# Patient Record
Sex: FEMALE | Race: WHITE | URBAN - METROPOLITAN AREA
[De-identification: names, ages, dates, MRNs, and addresses within clinical notes are randomized per-mention and may not be internally consistent; named-entity substitution may affect disease eponyms.]

---
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
## 2019-04-26 LAB
ALBUMIN SERPL-MCNC: 4.8 G/DL
ALP BLD-CCNC: 39 U/L
ALT SERPL-CCNC: 14 U/L
ANION GAP SERPL CALCULATED.3IONS-SCNC: NORMAL MMOL/L
AST SERPL-CCNC: 16 U/L
BASOPHILS ABSOLUTE: 47 /ΜL
BASOPHILS RELATIVE PERCENT: 0.5 %
BILIRUB SERPL-MCNC: 0.9 MG/DL (ref 0.1–1.4)
BUN BLDV-MCNC: 15 MG/DL
CALCIUM SERPL-MCNC: 9.6 MG/DL
CHLORIDE BLD-SCNC: 104 MMOL/L
CO2: 27 MMOL/L
CREAT SERPL-MCNC: 0.75 MG/DL
EOSINOPHILS ABSOLUTE: 75 /ΜL
EOSINOPHILS RELATIVE PERCENT: 0.8 %
FERRITIN: 69 NG/ML (ref 9–150)
GFR CALCULATED: 116
GLUCOSE BLD-MCNC: 93 MG/DL
HCT VFR BLD CALC: 37.4 % (ref 36–46)
HEMOGLOBIN: 13.1 G/DL (ref 12–16)
LYMPHOCYTES ABSOLUTE: 2143 /ΜL
LYMPHOCYTES RELATIVE PERCENT: 22.8 %
MCH RBC QN AUTO: 32.1 PG
MCHC RBC AUTO-ENTMCNC: 35 G/DL
MCV RBC AUTO: 91.7 FL
MONOCYTES ABSOLUTE: 489 /ΜL
MONOCYTES RELATIVE PERCENT: 5.2 %
NEUTROPHILS ABSOLUTE: 6646 /ΜL
NEUTROPHILS RELATIVE PERCENT: 70.7 %
PDW BLD-RTO: 11.9 %
PLATELET # BLD: 294 K/ΜL
PMV BLD AUTO: 10.8 FL
POTASSIUM SERPL-SCNC: 4.1 MMOL/L
RBC # BLD: 4.08 10^6/ΜL
SODIUM BLD-SCNC: 139 MMOL/L
TOTAL PROTEIN: 6.7
VITAMIN B-12: 583
VITAMIN D 25-HYDROXY: 29
VITAMIN D2, 25 HYDROXY: NORMAL
VITAMIN D3,25 HYDROXY: NORMAL
WBC # BLD: 9.4 10^3/ML

## 2021-01-29 ENCOUNTER — OFFICE VISIT (OUTPATIENT)
Dept: FAMILY MEDICINE CLINIC | Age: 21
End: 2021-01-29
Payer: COMMERCIAL

## 2021-01-29 VITALS
HEART RATE: 102 BPM | TEMPERATURE: 98.2 F | SYSTOLIC BLOOD PRESSURE: 98 MMHG | BODY MASS INDEX: 20.73 KG/M2 | OXYGEN SATURATION: 98 % | RESPIRATION RATE: 18 BRPM | DIASTOLIC BLOOD PRESSURE: 60 MMHG | HEIGHT: 66 IN | WEIGHT: 129 LBS

## 2021-01-29 DIAGNOSIS — F84.0 AUTISM SPECTRUM DISORDER: ICD-10-CM

## 2021-01-29 DIAGNOSIS — J45.20 MILD INTERMITTENT ASTHMA WITHOUT COMPLICATION: ICD-10-CM

## 2021-01-29 DIAGNOSIS — F41.9 ANXIETY: Primary | ICD-10-CM

## 2021-01-29 PROCEDURE — 99204 OFFICE O/P NEW MOD 45 MIN: CPT | Performed by: INTERNAL MEDICINE

## 2021-01-29 RX ORDER — NORETHINDRONE ACETATE AND ETHINYL ESTRADIOL 1MG-20(24)
KIT ORAL
COMMUNITY
Start: 2020-11-15

## 2021-01-29 RX ORDER — SERTRALINE HYDROCHLORIDE 100 MG/1
100 TABLET, FILM COATED ORAL DAILY
COMMUNITY
End: 2021-03-18 | Stop reason: SDUPTHER

## 2021-01-29 RX ORDER — LEVALBUTEROL TARTRATE 45 UG/1
AEROSOL, METERED ORAL
COMMUNITY
End: 2021-10-29 | Stop reason: SDUPTHER

## 2021-01-29 RX ORDER — CLONAZEPAM 0.5 MG/1
0.25 TABLET ORAL NIGHTLY PRN
Qty: 30 TABLET | Refills: 2 | Status: SHIPPED | OUTPATIENT
Start: 2021-01-29 | End: 2021-04-30 | Stop reason: SDUPTHER

## 2021-01-29 ASSESSMENT — ANXIETY QUESTIONNAIRES
1. FEELING NERVOUS, ANXIOUS, OR ON EDGE: 1-SEVERAL DAYS
2. NOT BEING ABLE TO STOP OR CONTROL WORRYING: 3-NEARLY EVERY DAY
5. BEING SO RESTLESS THAT IT IS HARD TO SIT STILL: 3-NEARLY EVERY DAY
6. BECOMING EASILY ANNOYED OR IRRITABLE: 3-NEARLY EVERY DAY
GAD7 TOTAL SCORE: 15
7. FEELING AFRAID AS IF SOMETHING AWFUL MIGHT HAPPEN: 1-SEVERAL DAYS

## 2021-01-29 ASSESSMENT — PATIENT HEALTH QUESTIONNAIRE - PHQ9
1. LITTLE INTEREST OR PLEASURE IN DOING THINGS: 0
SUM OF ALL RESPONSES TO PHQ9 QUESTIONS 1 & 2: 0
SUM OF ALL RESPONSES TO PHQ QUESTIONS 1-9: 0
2. FEELING DOWN, DEPRESSED OR HOPELESS: 0
SUM OF ALL RESPONSES TO PHQ QUESTIONS 1-9: 0

## 2021-01-29 NOTE — PROGRESS NOTES
[facility]  [address] [place] [address]  [place] [address]  Dept: [phone]  Dept Fax: [phone]     NAME: Florencio Castano        :  2000        MRN:  <[id number]>    Chief Complaint   Patient presents with    New Patient     est pcp    Anxiety     Zoloft is not enough / anxiety has increased lately    Allergies     allergies to bananas / blood test requested to see what she is allergic to       History of Present Illness  Florencio Castano is a 23 y.o. female who presents today to [facility]. Patient was previously following with Dr. Van Gaming located in [place]. Prior records will be obtained for review. Patient reports that in the summer 2019 she was evaluated at the [facility] for autism and diagnosed with autism level 1 in addition to social and general anxiety disorders. She was started on Zoloft at that time and over the last couple years her dose has slowly been titrated up to manage her anxiety symptoms. She reports that recently her obsessive behaviors have worsened. Patient is currently attending [employer] remotely [employer]. She does well in school and states that recently the dose of behaviors consist of the need to write down all of her assignments on a [employer] calendar which she usually does however now she has the need to check each assignment every single day against her assignment list as well as a course syllabus. When she goes to sleep at night she notes racing thoughts and anxiety about the day and her upcoming day. Reports waking up multiple times per night and having difficulty sleeping due to racing thoughts and obsessive behaviors. She has never been on any other therapies other than Zoloft. Otherwise patient takes daily birth control pills to manage mood swings associated with hormonal shifts.   She has tried to go off of the birth control and mood swings return almost immediately so she is very compliant with these. She also uses a Xopenex inhaler as needed for asthma symptoms. Review of Systems  Please see HPI above. All bolded are positive. Gen: fever, chills, fatigue, weakness, diaphoresis, or unintentional weight change  Head: headache, vision change, hearing loss  Chest: chest pain/heaviness, palpitations  Lungs: shortness of breath, wheezing, coughing, hemoptysis  Abdomen: abdominal pain, nausea, vomiting, diarrhea, constipation, melena, hematochezia, hematemesis, or loss of appetite  Extremities: lower extremity edema, myalgias, arthralgias  Urinary: dysuria, hematuria, weak flow, or increase in frequency  Neurologic: lightheadedness, dizziness, confusion, syncope  Endocrine: polydipsia, polyuria, heat or cold intolerance  Psychiatric: depression, suicidal ideation, anxiety  Derm: Rashes, ulcers, burns    Medical History   No past medical history on file. Surgical History   No past surgical history on file. Family History  No family history on file. Social History  Social History     Tobacco Use    Smoking status: Never Smoker    Smokeless tobacco: Never Used   Substance Use Topics    Alcohol use: Yes     Comment: wine       Home Medications  Current Outpatient Medications   Medication Sig Dispense Refill    sertraline (ZOLOFT) 100 MG tablet Take 100 mg by mouth daily      BLISOVI 24 FE 1-20 MG-MCG(24) TABS TAKE 1 TABLET BY MOUTH EVERY DAY FOR CONTINUOUS CYCLE SKIPPING INACTIVE PILLS.  levalbuterol (XOPENEX HFA) 45 MCG/ACT inhaler       clonazePAM (KLONOPIN) 0.5 MG tablet Take 0.5 tablets by mouth nightly as needed for Anxiety for up to 30 days. 30 tablet 2     No current facility-administered medications for this visit.          Allergies  Allergies   Allergen Reactions    Penicillins     Sulfa Antibiotics      Drops in eye, pt was young       Objective  Vitals:    01/29/21 1101   BP: 98/60   Pulse: 102   Resp: 18   Temp: 98.2 °F (36.8 °C)   SpO2: 98% Physical Exam:  General: Awake, alert, and oriented to person, place, time, and purpose, appears stated age and cooperative, No acute distress. Patient well engage throughout her visit today. Head: Normocephalic, atraumatic  Eyes: conjunctivae/corneas clear. PERRL, EOM's intact. Mouth: Mucous membranes moist with no pharyngeal exudate or erythema  Neck: no JVD, no adenopathy, no carotid bruit and supple, symmetrical, trachea midline  Back: symmetric, ROM normal, No CVA tenderness. Lungs: clear to auscultation bilaterally without wheezes, rales, or rhonchi  Heart: regular rate and rhythm, S1, S2 normal, no murmur, click, rub or gallop  Abdomen: soft, non-tender; bowel sounds normal; no masses,  no organomegaly  Extremities:atraumatic, no cyanosis, no edema  Pulses: 2+ pedal pulses palpated  Skin: color, texture, turgor within normal limits. No rashes or lesions or normal  Neurologic:speech appropriate, moves all 4 extremities, normal muscle strength and tone, CN 2-12 grossly intact    Labs  No results found for: WBC, HGB, HCT, PLT, NA, K, CL, CREATININE, BUN, CO2, GLUCOSE, ALT, AST, INR  No results found for: TSH  No results found for: TRIG  No results found for: HDL  No results found for: LDLCALC  No results found for: LABA1C  No results found for: INR, PROTIME   *All recent labs were reviewed. Please see electronic chart for a more comprehensive set of labs    Radiology  No results found. Health Maintenance Due   Topic Date Due    Hepatitis C screen  2000    HPV vaccine (1 - 2-dose series) 12/07/2010    HIV screen  12/07/2014    Chlamydia screen  12/07/2015    DTaP/Tdap/Td vaccine (1 - Tdap) 12/07/2018    Cervical cancer screen  12/07/2020         Assessment and Plan  [facility] was seen today for new patient, anxiety and allergies. Diagnoses and all orders for this visit:    Anxiety  -     clonazePAM (KLONOPIN) 0.5 MG tablet;  Take 0.5 tablets by mouth nightly as needed for Anxiety for up to 30 days.  -     External Referral To Counseling Services    Autism spectrum disorder    Mild intermittent asthma without complication    Patient was given a list of local counselors to review. Instructed to start the Klonopin at 0.25 mg nightly by breaking the pills in half. She was instructed to titrate up to the 0.5 mg dose if she is not receiving any relief from 0.25 mg dose. If she has no relief with a 0.5 mg dose she is to call the office for further instructions. Educational materials and/or home exercises printed for patient's review and were included in patient instructions on his/her After Visit Summary and given to patient at the end of visit. Counseled regarding above diagnosis, including possible risks and complications,  especially if left uncontrolled. Counseled regarding the possible side effects, risks, benefits and alternatives to treatment; patient and/or guardian verbalizes understanding, agrees,feels comfortable with and wishes to proceed with above treatment plan. Advised patient to call Nura Horta new medication issues, and read all Rx info from pharmacy to assure aware of all possible risks and side effects of medication before taking. Reviewed age and gender appropriate health screening exams and vaccinations. Advised patient regarding importance of keeping up with recommended health maintenance and to schedule as soon as possible if overdue, as this is important in assessing for undiagnosed pathology, especially cancer, as well as protecting against potentially harmful/life threatening disease. Patient verbalizes understanding and agrees with above counseling, assessment and plan. All questions answered.     Carlyle Pimentel DO   1/29/2021  12:03 PM

## 2021-02-24 DIAGNOSIS — Z83.79 FAMILY HISTORY OF CELIAC DISEASE: Primary | ICD-10-CM

## 2021-02-26 DIAGNOSIS — Z83.79 FAMILY HISTORY OF CELIAC DISEASE: ICD-10-CM

## 2021-02-27 LAB — IGA: 147 MG/DL (ref 70–400)

## 2021-03-01 LAB — TISSUE TRANSGLUTAMINASE IGA: 6 U/ML (ref 0–3)

## 2021-03-02 DIAGNOSIS — K90.9 INTESTINAL MALABSORPTION, UNSPECIFIED TYPE: Primary | ICD-10-CM

## 2021-03-18 DIAGNOSIS — F41.9 ANXIETY: Primary | ICD-10-CM

## 2021-03-18 RX ORDER — SERTRALINE HYDROCHLORIDE 100 MG/1
100 TABLET, FILM COATED ORAL DAILY
Qty: 30 TABLET | Refills: 5 | Status: SHIPPED
Start: 2021-03-18 | End: 2021-04-30 | Stop reason: SDUPTHER

## 2021-04-05 ENCOUNTER — IMMUNIZATION (OUTPATIENT)
Dept: PRIMARY CARE CLINIC | Age: 21
End: 2021-04-05
Payer: COMMERCIAL

## 2021-04-05 PROCEDURE — 91301 COVID-19, MODERNA VACCINE 100MCG/0.5ML DOSE: CPT | Performed by: NURSE PRACTITIONER

## 2021-04-05 PROCEDURE — 0011A COVID-19, MODERNA VACCINE 100MCG/0.5ML DOSE: CPT | Performed by: NURSE PRACTITIONER

## 2021-04-30 ENCOUNTER — OFFICE VISIT (OUTPATIENT)
Dept: FAMILY MEDICINE CLINIC | Age: 21
End: 2021-04-30
Payer: COMMERCIAL

## 2021-04-30 VITALS
WEIGHT: 131 LBS | OXYGEN SATURATION: 98 % | SYSTOLIC BLOOD PRESSURE: 92 MMHG | DIASTOLIC BLOOD PRESSURE: 60 MMHG | HEART RATE: 90 BPM | TEMPERATURE: 97 F | RESPIRATION RATE: 18 BRPM | BODY MASS INDEX: 21.14 KG/M2

## 2021-04-30 DIAGNOSIS — F41.9 ANXIETY: ICD-10-CM

## 2021-04-30 PROCEDURE — 99213 OFFICE O/P EST LOW 20 MIN: CPT | Performed by: INTERNAL MEDICINE

## 2021-04-30 RX ORDER — CLONAZEPAM 0.5 MG/1
0.25 TABLET ORAL NIGHTLY PRN
Qty: 45 TABLET | Refills: 1 | Status: SHIPPED
Start: 2021-04-30 | End: 2021-10-29 | Stop reason: SDUPTHER

## 2021-04-30 RX ORDER — SERTRALINE HYDROCHLORIDE 100 MG/1
100 TABLET, FILM COATED ORAL DAILY
Qty: 90 TABLET | Refills: 1 | Status: SHIPPED
Start: 2021-04-30 | End: 2021-10-19

## 2021-04-30 SDOH — ECONOMIC STABILITY: TRANSPORTATION INSECURITY
IN THE PAST 12 MONTHS, HAS LACK OF TRANSPORTATION KEPT YOU FROM MEETINGS, WORK, OR FROM GETTING THINGS NEEDED FOR DAILY LIVING?: NO

## 2021-04-30 SDOH — ECONOMIC STABILITY: TRANSPORTATION INSECURITY
IN THE PAST 12 MONTHS, HAS THE LACK OF TRANSPORTATION KEPT YOU FROM MEDICAL APPOINTMENTS OR FROM GETTING MEDICATIONS?: NO

## 2021-04-30 SDOH — ECONOMIC STABILITY: FOOD INSECURITY: WITHIN THE PAST 12 MONTHS, YOU WORRIED THAT YOUR FOOD WOULD RUN OUT BEFORE YOU GOT MONEY TO BUY MORE.: NEVER TRUE

## 2021-04-30 SDOH — ECONOMIC STABILITY: FOOD INSECURITY: WITHIN THE PAST 12 MONTHS, THE FOOD YOU BOUGHT JUST DIDN'T LAST AND YOU DIDN'T HAVE MONEY TO GET MORE.: NEVER TRUE

## 2021-04-30 NOTE — PROGRESS NOTES
[facility] PRIMARY [facility]  [address] [address]  Dept: [phone]  Dept Fax: [phone]     NAME: Carol Melvin        :  2000        MRN:  <[id number]>    Chief Complaint   Patient presents with    3 Month Follow-Up    Anxiety    Medication Refill     nees 90 day rx's or insurance will not cover per patient       Subjective     History of Present Illness  Carol Melvin is a 23 y.o. female who presents today for routine follow up and medication refill. She has been doing well on 0.25 mg Klonopin and 100 mg Zoloft both taken at night. She has started seeing Grace at [facility] and reports that she is liking her therapist and doing well there. She did follow up with GI and underwent EGD evaluation for possible celiacs disease. She was told that all biopsies were negative and she does not have celiacs, rather gastritis. She has been following a gluten free diet for the last several weeks and notes a drastic improvement in her symptoms. Review of Systems  Please see HPI above. All bolded are positive. Gen: fever, chills, fatigue, weakness, diaphoresis, unintentional weight change  Head: headache, vision change, hearing loss  Chest: chest pain/heaviness, palpitations  Lungs: shortness of breath, wheezing, coughing, hemoptysis  Abdomen: abdominal pain, nausea, vomiting, diarrhea, constipation, melena, hematochezia, hematemesis, loss of appetite  Extremities: lower extremity edema, myalgias, arthralgias  Urinary: dysuria, hematuria, weak flow, increase in frequency  Neurologic: lightheadedness, dizziness, confusion, syncope  Endocrine: polydipsia, polyuria, heat or cold intolerance  Psychiatric: depression, suicidal ideation, anxiety  Derm: Rashes, ulcers, burns    Past Medical Hx:  No past medical history on file. Past Surgical Hx:  No past surgical history on file. Family Hx:  No family history on file.     Social Hx:  Social History     Tobacco Use    Smoking status: Never Smoker    Smokeless tobacco: Never Used   Substance Use Topics    Alcohol use: Yes     Comment: wine       Home Medications:  Current Outpatient Medications   Medication Sig Dispense Refill    sertraline (ZOLOFT) 100 MG tablet Take 1 tablet by mouth daily 90 tablet 1    clonazePAM (KLONOPIN) 0.5 MG tablet Take 0.5 tablets by mouth nightly as needed for Anxiety for up to 180 days. 45 tablet 1    BLISOVI 24 FE 1-20 MG-MCG(24) TABS TAKE 1 TABLET BY MOUTH EVERY DAY FOR CONTINUOUS CYCLE SKIPPING INACTIVE PILLS.  levalbuterol (XOPENEX HFA) 45 MCG/ACT inhaler        No current facility-administered medications for this visit. Allergies: Allergies   Allergen Reactions    Amoxicillin      rash    Penicillins     Sulfa Antibiotics      Drops in eye, pt was young       Objective     Vitals:    04/30/21 0946   BP: 92/60   Pulse: 90   Resp: 18   Temp: 97 °F (36.1 °C)   TempSrc: Temporal   SpO2: 98%   Weight: 131 lb (59.4 kg)        Physical Exam  General: Awake, alert, and oriented to person, place, time, and purpose, appears stated age and cooperative, No acute distress  Head: Normocephalic, atraumatic  Eyes: conjunctivae/corneas clear, EOMI  Mouth: Mucous membranes moist with no pharyngeal exudate or erythema  Neck: no JVD, no adenopathy, no carotid bruit, supple, symmetrical, trachea midline  Back: symmetric, ROM normal, No CVA tenderness. Lungs: clear to auscultation bilaterally without wheezes, rales, or rhonchi  Heart: regular rate and rhythm, S1, S2 normal, no murmur, click, rub or gallop  Abdomen: soft, non-tender; bowel sounds normal; no masses,  no organomegaly  Extremities: atraumatic, no cyanosis, no edema, 2+ pulses palpated in all 4 extremities  Skin: color, texture, turgor within normal limits.  No rashes or lesions or normal  Neurologic:speech appropriate, moves all 4 extremities, normal muscle strength and tone, CN 2-12 grossly intact    Labs:  No results found for: WBC, HGB, HCT, PLT, NA, K, CL, CREATININE, BUN, CO2, GLUCOSE, ALT, AST, INR  No results found for: TSH  No results found for: TRIG  No results found for: HDL  No results found for: LDLCALC  No results found for: LABA1C  No results found for: INR, PROTIME   *All recent labs were reviewed. Please see electronic chart for a more comprehensive set of labs    Radiology:  No results found. Assessment and Plan     Patient is a 23 y.o. female who presented to the office for follow up. Full problem list is as follows: There is no problem list on file for this patient. Diamond Resendiz was seen today for 3 month follow-up, anxiety and medication refill. Diagnoses and all orders for this visit:    Anxiety  -     sertraline (ZOLOFT) 100 MG tablet; Take 1 tablet by mouth daily  -     clonazePAM (KLONOPIN) 0.5 MG tablet; Take 0.5 tablets by mouth nightly as needed for Anxiety for up to 180 days. Patient needs 90 day supplies per insurance. Educational materials and/or home exercises printed for patient's review and were included in patient instructions on his/her After Visit Summary and given to patient at the end of visit. Counseled regarding above diagnosis, including possible risks and complications, especially if left uncontrolled. Counseled regarding the possible side effects, risks, benefits and alternatives to treatment; patient and/or guardian verbalizes understanding, agrees, feels comfortable with and wishes to proceed with above treatment plan. Advised patient to call Jerson Rios new medication issues, and read all Rx info from pharmacy to assure aware of all possible risks and side effects of any medication before taking. Reviewed age and gender appropriate health screening exams and vaccinations.   Advised patient regarding importance of keeping up with recommended health maintenance and to schedule as soon as possible if overdue, as this is important in assessing for undiagnosed pathology, especially cancer, as well as protecting against potentially harmful/life threatening disease. Patient verbalizes understanding and agrees with above counseling, assessment and plan. All questions answered.     Riki Gayle DO   4/30/2021  10:14 AM

## 2021-05-04 ENCOUNTER — IMMUNIZATION (OUTPATIENT)
Dept: PRIMARY CARE CLINIC | Age: 21
End: 2021-05-04
Payer: COMMERCIAL

## 2021-05-04 PROCEDURE — 91301 COVID-19, MODERNA VACCINE 100MCG/0.5ML DOSE: CPT | Performed by: NURSE PRACTITIONER

## 2021-05-04 PROCEDURE — 0012A COVID-19, MODERNA VACCINE 100MCG/0.5ML DOSE: CPT | Performed by: NURSE PRACTITIONER

## 2021-10-18 DIAGNOSIS — F41.9 ANXIETY: ICD-10-CM

## 2021-10-19 RX ORDER — SERTRALINE HYDROCHLORIDE 100 MG/1
TABLET, FILM COATED ORAL
Qty: 90 TABLET | Refills: 1 | Status: SHIPPED
Start: 2021-10-19 | End: 2021-10-29 | Stop reason: SDUPTHER

## 2021-10-29 ENCOUNTER — OFFICE VISIT (OUTPATIENT)
Dept: FAMILY MEDICINE CLINIC | Age: 21
End: 2021-10-29
Payer: COMMERCIAL

## 2021-10-29 VITALS
HEART RATE: 102 BPM | HEIGHT: 66 IN | SYSTOLIC BLOOD PRESSURE: 98 MMHG | RESPIRATION RATE: 15 BRPM | OXYGEN SATURATION: 97 % | TEMPERATURE: 97.9 F | DIASTOLIC BLOOD PRESSURE: 66 MMHG | WEIGHT: 123.4 LBS | BODY MASS INDEX: 19.83 KG/M2

## 2021-10-29 DIAGNOSIS — J45.909 UNCOMPLICATED ASTHMA, UNSPECIFIED ASTHMA SEVERITY, UNSPECIFIED WHETHER PERSISTENT: ICD-10-CM

## 2021-10-29 DIAGNOSIS — Z23 FLU VACCINE NEED: ICD-10-CM

## 2021-10-29 DIAGNOSIS — L98.9 SKIN LESION: ICD-10-CM

## 2021-10-29 DIAGNOSIS — F41.9 ANXIETY: Primary | ICD-10-CM

## 2021-10-29 PROCEDURE — 90674 CCIIV4 VAC NO PRSV 0.5 ML IM: CPT | Performed by: INTERNAL MEDICINE

## 2021-10-29 PROCEDURE — 99213 OFFICE O/P EST LOW 20 MIN: CPT | Performed by: INTERNAL MEDICINE

## 2021-10-29 PROCEDURE — 90471 IMMUNIZATION ADMIN: CPT | Performed by: INTERNAL MEDICINE

## 2021-10-29 RX ORDER — LEVALBUTEROL TARTRATE 45 UG/1
2 AEROSOL, METERED ORAL EVERY 6 HOURS PRN
Qty: 1 EACH | Refills: 3 | Status: SHIPPED | OUTPATIENT
Start: 2021-10-29

## 2021-10-29 RX ORDER — CLONAZEPAM 0.5 MG/1
0.25 TABLET ORAL NIGHTLY PRN
Qty: 45 TABLET | Refills: 1 | Status: SHIPPED
Start: 2021-10-29 | End: 2022-05-04 | Stop reason: SDUPTHER

## 2021-10-29 RX ORDER — SERTRALINE HYDROCHLORIDE 100 MG/1
TABLET, FILM COATED ORAL
Qty: 90 TABLET | Refills: 1 | Status: SHIPPED
Start: 2021-10-29 | End: 2022-05-04 | Stop reason: SDUPTHER

## 2021-10-29 NOTE — PROGRESS NOTES
[facility]  [address] [place] [address]  [place] [address]  Dept: [phone]  Dept Fax: [phone]     NAME: Jasmyne Jean        :  2000        MRN:  <[id number]>    Chief Complaint   Patient presents with    Anxiety     doing ok    Flu Vaccine       Subjective     History of Present Illness  Jasmyne Jean is a 23 y.o. female who presents today for routine follow up and medication refill. Patient reports that she is doing well on her current medications. She continues to follow a gluten free diet and is doing well. Patient is wanting a dermatology recommendation as she has a wart on her foot and reports a couple other concerning lesions she would like looked at. Wart freezing was offered to her today and she declined. Review of Systems  Please see HPI above. All bolded are positive. Gen: fever, chills, fatigue, weakness, diaphoresis, unintentional weight change  Head: headache, vision change, hearing loss  Chest: chest pain/heaviness, palpitations  Lungs: shortness of breath, wheezing, coughing, hemoptysis  Abdomen: abdominal pain, nausea, vomiting, diarrhea, constipation, melena, hematochezia, hematemesis, loss of appetite  Extremities: lower extremity edema, myalgias, arthralgias  Urinary: dysuria, hematuria, weak flow, increase in frequency  Neurologic: lightheadedness, dizziness, confusion, syncope  Endocrine: polydipsia, polyuria, heat or cold intolerance  Psychiatric: depression, suicidal ideation, anxiety  Derm: Rashes, ulcers, burns    Past Medical Hx:  No past medical history on file. Past Surgical Hx:  No past surgical history on file. Family Hx:  No family history on file.     Social Hx:  Social History     Tobacco Use    Smoking status: Never Smoker    Smokeless tobacco: Never Used   Substance Use Topics    Alcohol use: Yes     Comment: wine       Home Medications:  Current Outpatient Medications   Medication Sig Dispense Refill    clonazePAM (KLONOPIN) 0.5 MG tablet Take 0.5 tablets by mouth nightly as needed for Anxiety for up to 180 days. 45 tablet 1    sertraline (ZOLOFT) 100 MG tablet TAKE 1 TABLET BY MOUTH EVERY DAY 90 tablet 1    levalbuterol (XOPENEX HFA) 45 MCG/ACT inhaler Inhale 2 puffs into the lungs every 6 hours as needed for Wheezing 1 each 3    BLISOVI 24 FE 1-20 MG-MCG(24) TABS TAKE 1 TABLET BY MOUTH EVERY DAY FOR CONTINUOUS CYCLE SKIPPING INACTIVE PILLS. No current facility-administered medications for this visit. Allergies: Allergies   Allergen Reactions    Amitriptyline Other (See Comments)     Tachycardia from elavil     Amoxicillin      rash    Gluten Meal Other (See Comments)    Penicillins     Sulfa Antibiotics      Drops in eye, pt was young       Objective     Vitals:    10/29/21 0950   BP: 98/66   Pulse: 102   Resp: 15   Temp: 97.9 °F (36.6 °C)   TempSrc: Temporal   SpO2: 97%   Weight: 123 lb 6.4 oz (56 kg)   Height: 5' 6\" (1.676 m)        Physical Exam  General: Awake, alert, and oriented to person, place, time, and purpose, appears stated age and cooperative, No acute distress  Head: Normocephalic, atraumatic  Eyes: conjunctivae/corneas clear, EOMI  Mouth: Mucous membranes moist with no pharyngeal exudate or erythema  Neck: no JVD, no adenopathy, no carotid bruit, supple, symmetrical, trachea midline  Back: symmetric, ROM normal, No CVA tenderness. Lungs: clear to auscultation bilaterally without wheezes, rales, or rhonchi  Heart: regular rate and rhythm, S1, S2 normal, no murmur, click, rub or gallop  Abdomen: soft, non-tender; bowel sounds normal; no masses,  no organomegaly  Extremities: atraumatic, no cyanosis, no edema, 2+ pulses palpated in all 4 extremities  Skin: color, texture, turgor within normal limits.  No rashes or lesions or normal  Neurologic:speech appropriate, moves all 4 extremities, normal muscle strength and tone, CN 2-12 grossly intact    Labs:  Lab Results Component Value Date    WBC 9.4 04/26/2019    HGB 13.1 04/26/2019    HCT 37.4 04/26/2019     04/26/2019     04/26/2019    K 4.1 04/26/2019     04/26/2019    CREATININE 0.75 04/26/2019    BUN 15 04/26/2019    CO2 27 04/26/2019    GLUCOSE 93 04/26/2019    ALT 14 04/26/2019    AST 16 04/26/2019     No results found for: TSH  No results found for: TRIG  No results found for: HDL  No results found for: LDLCALC  No results found for: LABA1C  No results found for: INR, PROTIME   *All recent labs were reviewed. Please see electronic chart for a more comprehensive set of labs    Radiology:  No results found. Assessment and Plan     Patient is a 23 y.o. female who presented to the office for follow up. Full problem list is as follows:  Patient Active Problem List   Diagnosis    Anxiety    Uncomplicated asthma       [place] Pelon was seen today for anxiety and flu vaccine. Diagnoses and all orders for this visit:    Anxiety  -     clonazePAM (KLONOPIN) 0.5 MG tablet; Take 0.5 tablets by mouth nightly as needed for Anxiety for up to 180 days. -     sertraline (ZOLOFT) 100 MG tablet; TAKE 1 TABLET BY MOUTH EVERY DAY    Uncomplicated asthma, unspecified asthma severity, unspecified whether persistent  -     levalbuterol (XOPENEX HFA) 45 MCG/ACT inhaler; Inhale 2 puffs into the lungs every 6 hours as needed for Wheezing    Flu vaccine need  -     INFLUENZA, MDCK QUADV, 2 YRS AND OLDER, IM, PF, PREFILL SYR OR SDV, 0.5ML (FLUCELVAX QUADV, PF)    Skin lesion  -     External Referral To Dermatology        Educational materials and/or home exercises printed for patient's review and were included in patient instructions on his/her After Visit Summary and given to patient at the end of visit. Counseled regarding above diagnosis, including possible risks and complications, especially if left uncontrolled.      Counseled regarding the possible side effects, risks, benefits and alternatives to treatment; patient and/or guardian verbalizes understanding, agrees, feels comfortable with and wishes to proceed with above treatment plan. Advised patient to call Deanna Min new medication issues, and read all Rx info from pharmacy to assure aware of all possible risks and side effects of any medication before taking. Reviewed age and gender appropriate health screening exams and vaccinations. Advised patient regarding importance of keeping up with recommended health maintenance and to schedule as soon as possible if overdue, as this is important in assessing for undiagnosed pathology, especially cancer, as well as protecting against potentially harmful/life threatening disease. Patient verbalizes understanding and agrees with above counseling, assessment and plan. All questions answered.     Rodríguez Craven DO   10/29/2021  10:49 AM

## 2021-10-29 NOTE — PATIENT INSTRUCTIONS
[facility]  [address] [facility]  [phone] 37 [address] [place] [address],[address]  [facility]  [phone]    [address] [place] [address]  [phone]

## 2022-05-04 ENCOUNTER — OFFICE VISIT (OUTPATIENT)
Dept: FAMILY MEDICINE CLINIC | Age: 22
End: 2022-05-04
Payer: COMMERCIAL

## 2022-05-04 VITALS
HEART RATE: 81 BPM | TEMPERATURE: 98 F | SYSTOLIC BLOOD PRESSURE: 112 MMHG | DIASTOLIC BLOOD PRESSURE: 66 MMHG | BODY MASS INDEX: 20.7 KG/M2 | OXYGEN SATURATION: 98 % | WEIGHT: 128.8 LBS | HEIGHT: 66 IN | RESPIRATION RATE: 16 BRPM

## 2022-05-04 DIAGNOSIS — F41.9 ANXIETY: ICD-10-CM

## 2022-05-04 PROCEDURE — 99213 OFFICE O/P EST LOW 20 MIN: CPT | Performed by: INTERNAL MEDICINE

## 2022-05-04 RX ORDER — SERTRALINE HYDROCHLORIDE 100 MG/1
TABLET, FILM COATED ORAL
Qty: 90 TABLET | Refills: 1 | Status: SHIPPED | OUTPATIENT
Start: 2022-05-04

## 2022-05-04 RX ORDER — CLONAZEPAM 0.5 MG/1
0.25 TABLET ORAL NIGHTLY PRN
Qty: 45 TABLET | Refills: 1 | Status: SHIPPED | OUTPATIENT
Start: 2022-05-04 | End: 2022-10-31

## 2022-05-04 SDOH — ECONOMIC STABILITY: FOOD INSECURITY: WITHIN THE PAST 12 MONTHS, YOU WORRIED THAT YOUR FOOD WOULD RUN OUT BEFORE YOU GOT MONEY TO BUY MORE.: NEVER TRUE

## 2022-05-04 SDOH — ECONOMIC STABILITY: FOOD INSECURITY: WITHIN THE PAST 12 MONTHS, THE FOOD YOU BOUGHT JUST DIDN'T LAST AND YOU DIDN'T HAVE MONEY TO GET MORE.: NEVER TRUE

## 2022-05-04 ASSESSMENT — PATIENT HEALTH QUESTIONNAIRE - PHQ9
SUM OF ALL RESPONSES TO PHQ QUESTIONS 1-9: 2
SUM OF ALL RESPONSES TO PHQ9 QUESTIONS 1 & 2: 2
SUM OF ALL RESPONSES TO PHQ QUESTIONS 1-9: 2
1. LITTLE INTEREST OR PLEASURE IN DOING THINGS: 2
SUM OF ALL RESPONSES TO PHQ QUESTIONS 1-9: 2
2. FEELING DOWN, DEPRESSED OR HOPELESS: 0
SUM OF ALL RESPONSES TO PHQ QUESTIONS 1-9: 2

## 2022-05-04 ASSESSMENT — SOCIAL DETERMINANTS OF HEALTH (SDOH): HOW HARD IS IT FOR YOU TO PAY FOR THE VERY BASICS LIKE FOOD, HOUSING, MEDICAL CARE, AND HEATING?: NOT HARD AT ALL

## 2022-05-04 NOTE — PROGRESS NOTES
[facility] PRIMARY CARE  [address] [address]  Dept: [phone]  Dept Fax: [phone]     NAME: Shefali Cat        :  2000        MRN:  [id number]    Chief Complaint   Patient presents with    Anxiety     6 month follow up. Subjective     History of Present Illness  Shefali Cat is a 24 y.o. female who presents today for routine follow up and medication refill. Patient reports that she has been doing very well recently. Her anxiety is well controlled on the nightly 0.25 mg dose of Klonopin. She also remains well controlled on her 100 mg of Zoloft daily. Patient is graduating soon and moving to [facility] [employer] to work as a  for a week. She does express interest in trying to find somewhere to have a formal evaluation for her suspected diagnoses of autism versus OCD versus anxiety, and what symptoms are related to each disorder and what can be done long-term to help her cope with these. Review of Systems  Please see HPI above. All bolded are positive. Gen: fever, chills, fatigue, weakness, diaphoresis, unintentional weight change  Head: headache, vision change, hearing loss  Chest: chest pain/heaviness, palpitations  Lungs: shortness of breath, wheezing, coughing, hemoptysis  Abdomen: abdominal pain, nausea, vomiting, diarrhea, constipation, melena, hematochezia, hematemesis, loss of appetite  Extremities: lower extremity edema, myalgias, arthralgias  Urinary: dysuria, hematuria, weak flow, increase in frequency  Neurologic: lightheadedness, dizziness, confusion, syncope  Endocrine: polydipsia, polyuria, heat or cold intolerance  Psychiatric: depression, suicidal ideation, anxiety  Derm: Rashes, ulcers, burns    Past Medical Hx:  No past medical history on file. Past Surgical Hx:  No past surgical history on file. Family Hx:  No family history on file.     Social Hx:  Social History     Tobacco Use    Smoking status: Never Smoker    Smokeless tobacco: Never Used   Substance Use Topics    Alcohol use: Yes     Comment: wine       Home Medications:  Current Outpatient Medications   Medication Sig Dispense Refill    clonazePAM (KLONOPIN) 0.5 MG tablet Take 0.5 tablets by mouth nightly as needed for Anxiety for up to 180 days. 45 tablet 1    sertraline (ZOLOFT) 100 MG tablet TAKE 1 TABLET BY MOUTH EVERY DAY 90 tablet 1    levalbuterol (XOPENEX HFA) 45 MCG/ACT inhaler Inhale 2 puffs into the lungs every 6 hours as needed for Wheezing 1 each 3    BLISOVI 24 FE 1-20 MG-MCG(24) TABS TAKE 1 TABLET BY MOUTH EVERY DAY FOR CONTINUOUS CYCLE SKIPPING INACTIVE PILLS. No current facility-administered medications for this visit. Allergies: Allergies   Allergen Reactions    Amitriptyline Other (See Comments)     Tachycardia from elavil     Amoxicillin      rash    Gluten Meal Other (See Comments)    Penicillins     Sulfa Antibiotics      Drops in eye, pt was young       Objective     Vitals:    05/04/22 1509   BP: 112/66   Pulse: 81   Resp: 16   Temp: 98 °F (36.7 °C)   TempSrc: Temporal   SpO2: 98%   Weight: 128 lb 12.8 oz (58.4 kg)   Height: 5' 6\" (1.676 m)        Physical Exam  General: Awake, alert, and oriented to person, place, time, and purpose, appears stated age and cooperative, No acute distress  Head: Normocephalic, atraumatic  Eyes: conjunctivae/corneas clear, EOMI  Mouth: Mucous membranes moist with no pharyngeal exudate or erythema  Neck: no JVD, no adenopathy, no carotid bruit, supple, symmetrical, trachea midline  Back: symmetric, ROM normal, No CVA tenderness. Lungs: clear to auscultation bilaterally without wheezes, rales, or rhonchi  Heart: regular rate and rhythm, S1, S2 normal, no murmur, click, rub or gallop  Abdomen: soft, non-tender; bowel sounds normal; no masses,  no organomegaly  Extremities: atraumatic, no cyanosis, no edema  Skin: color, texture, turgor within normal limits.  No rashes or lesions   Neurologic:speech appropriate, moves all 4 extremities, normal muscle strength and tone, CN 2-12 grossly intact    Labs:  Lab Results   Component Value Date    WBC 9.4 04/26/2019    HGB 13.1 04/26/2019    HCT 37.4 04/26/2019     04/26/2019     04/26/2019    K 4.1 04/26/2019     04/26/2019    CREATININE 0.75 04/26/2019    BUN 15 04/26/2019    CO2 27 04/26/2019    GLUCOSE 93 04/26/2019    ALT 14 04/26/2019    AST 16 04/26/2019     No results found for: TSH  No results found for: TRIG  No results found for: HDL  No results found for: LDLCALC  No results found for: LABA1C  No results found for: INR, PROTIME   *All recent labs were reviewed. Please see electronic chart for a more comprehensive set of labs    Radiology:  No results found. Assessment and Plan     Patient is a 24 y.o. female who presented to the office for follow up. Full problem list is as follows:  Patient Active Problem List   Diagnosis    Anxiety    Uncomplicated asthma       Farhan Garcia was seen today for anxiety. Diagnoses and all orders for this visit:    Anxiety  -     clonazePAM (KLONOPIN) 0.5 MG tablet; Take 0.5 tablets by mouth nightly as needed for Anxiety for up to 180 days. -     sertraline (ZOLOFT) 100 MG tablet; TAKE 1 TABLET BY MOUTH EVERY DAY    -Patient's anxiety is very well controlled at this time. We will refill her medications. [place] [facility] [place] [facility] [place] but is unsure whether she will be establishing with a new physician up there or not. -Advised her that she is more than welcome to keep following here and that her next visit could be virtual if this makes it easier for her.   -Patient states that she will be remaining on her parents health care insurance as long as she is able to. She will be looking into both [facility] clinic and [facility] as well as other specialists in the [facility] area for formal evaluation into her neuro divergence.     Educational materials and/or home exercises printed for patient's review and were included in patient instructions on his/her After Visit Summary and given to patient at the end of visit. Counseled regarding above diagnosis, including possible risks and complications, especially if left uncontrolled. Counseled regarding the possible side effects, risks, benefits and alternatives to treatment; patient and/or guardian verbalizes understanding, agrees, feels comfortable with and wishes to proceed with above treatment plan. Advised patient to call Debby Schwab new medication issues, and read all Rx info from pharmacy to assure aware of all possible risks and side effects of any medication before taking. Patient verbalizes understanding and agrees with above counseling, assessment and plan. All questions answered.     Edwar Shoulders, DO

## 2022-05-04 NOTE — PATIENT INSTRUCTIONS
[facility]   [email or web address]    [place], [address], [address] [place] [address],[address]  [place], [address]  Phone: [phone]  Fax: [phone]

## 2022-12-29 DIAGNOSIS — F41.9 ANXIETY: ICD-10-CM

## 2022-12-29 RX ORDER — SERTRALINE HYDROCHLORIDE 100 MG/1
TABLET, FILM COATED ORAL
Qty: 30 TABLET | Refills: 0 | Status: SHIPPED | OUTPATIENT
Start: 2022-12-29

## 2022-12-29 NOTE — TELEPHONE ENCOUNTER
Occupational Therapy   Initial Evaluation     Patient Name: Noemi Lopez  Age:  70 y.o., Sex:  female  Medical Record #: [id number]  Today's Date: 8/21/2019     Subjective    Pt was seated in w/c with family present and agreeable to shower for evaluation. Pt reported double vision with both eyes open. Family reported that acute OT recommended an eye patch, however, the MD declined so pt has no eye patch. Pt covers one eye most of the time to avoid symptoms     Objective       08/21/19 1331   Prior Living Situation   Prior Services None   Housing / Facility 1 Story House   Steps Into Home 2   Steps In Home 0   Rail None   Elevator No   Bathroom Set up Bathtub / Shower Combination   Equipment Owned None   Lives with - Patient's Self Care Capacity Adult Children  (family members available to assist 24/7)   Prior Level of ADL Function   Self Feeding Independent   Grooming / Hygiene Independent   Bathing Independent   Dressing Independent   Toileting Independent   Prior Level of IADL Function   Medication Management Independent   Laundry Independent   Kitchen Mobility Independent   Finances Independent   Home Management Independent   Shopping Independent   Prior Level Of Mobility Independent Without Device in Community   Driving / Transportation Relatives / Others Provide Transportation   Occupation (Pre-Hospital Vocational) Retired Due To Age   IRF-LISS:  Prior Functioning: Everyday Activities   Self Care Independent   Indoor Mobility (Ambulation) Independent   Stairs Independent   Functional Cognition Independent   Prior Device Use Manual wheelchair   Vitals   Pulse 93   Patient BP Position Sitting   Blood Pressure  138/74   Pulse Oximetry 95 %   Cognition    Orientation Level Oriented x 4   Level of Consciousness Alert   IRF-LISS:  Brief Interview for Mental Status (BIMS)   Repetition of Three Words (First Attempt) 3   Temporal Orientation: Able to Report the Correct Year Correct   Temporal Orientation: Able to  Please call patient to schedule an appointment. "Report the Correct Month Accurate within 5 days   Temporal Orientation: Able to Report the Correct Day of the Week Correct   Able to Recall \"Sock\" Yes, after cueing (\"something to wear\")   Able to Recall \"Blue\" Yes, after cueing (\"a color\")   Able to Recall \"Bed\" Yes, no cue required   BIMS Summary Score 13   Vision Screen   Visual Acuity Able to read clock/calander on wall without difficulty   Tracking Decreased smoothness of horizontal tracking  (R eye delayed hz tracking, nystagmus vertical tracking)   Saccades   (no noted deficits)   Convergence Breaks at 7 from nose  (full convergence )   Passive ROM Upper Body   Passive ROM Upper Body WDL   Active ROM Upper Body   Active ROM Upper Body  WDL   Strength Upper Body   Upper Body Strength  X   Comments generalized weakness d/t hospitalization - WFL   Sensation Upper Body   Upper Extremity Sensation  WDL   Upper Body Muscle Tone   Upper Body Muscle Tone  WDL   Balance Assessment   Sitting Balance (Static) Fair   Sitting Balance (Dynamic) Fair -   Standing Balance (Static) Fair -   Standing Balance (Dynamic) Fair -   Weight Shift Sitting Good   Weight Shift Standing Good   IRF-LISS:  Eating   Assistance Needed Set-up / clean-up   [place] Physical Assistance Level No physical assistance or only touching/steadying assist   CARE Score 5   Discharge Goal:  Assistance Needed Independent   Discharge Goal:  Physical Assistance Level No physical assistance or only touching/steadying assist   Discharge Goal:  Score 6   IRF-LISS:  Oral Hygiene   Assistance Needed Set-up / clean-up   Physical Assistance Level No physical assistance   CARE Score 5   Discharge Goal:  Assistance Needed Independent   Discharge Goal:  Physical Assistance Level No physical assistance or only touching/steadying assist   Discharge Goal:  Score 6   IRF-LISS:  Shower/Bathe Self   Assistance Needed Set-up / clean-up;Verbal cues;Supervision   Physical Assistance Level No physical assistance or only " touching/steadying assist   CARE Score 4   Discharge Goal:  Assistance Needed Adaptive equipment;Independent   Discharge Goal:  Physical Assistance Level No physical assistance or only touching/steadying assist   Discharge Goal Score 6   IRF-LISS:  Upper Body Dressing   Assistance Needed Adaptive equipment;Physical assistance   Physical Assistance Level Less than 25%   CARE Score 3   Discharge Goal:  Assistance Needed Independent   Discharge Goal:  Physical Assistance Level No physical assistance or only touching/steadying assist   Dischage Goal:  Score 6   IRF-LISS:  Lower Body Dressing   Assistance Needed Supervision;Verbal cues   Physical Assistance Level No physical assistance or only touching/steadying assist   CARE Score 4   Discharge Goal:  Assistance Needed Independent;Adaptive equipment   Discharge Goal:  Physical Assistance Level No physical assistance or only touching/steadying assist   Discharge Goal:  Score 6   IRF LISS:  Putting On/Taking Off Footwear   Assistance Needed Set-up / clean-up   Physical Assistance Level No physical assistance or only touching/steadying assist   CARE Score 5   Discharge Goal:  Assistance Needed Independent;Adaptive equipment   Discharge Goal:  Physical Assistance Level No physical assistance or only touching/steadying assist   Discharge Goal:  Score 6   IRF-LISS:  Toileting Hygiene   Assistance Needed Supervision   Physical Assistance Level No physical assistance or only touching/steadying assist   CARE Score 4   Discharge Goal:  Assistance Needed Independent;Adaptive equipment   Discharge Goal:  Physical Assistance Level No physical assistance or only touching/steadying assist   Discharge Goal:  Score 6   IRF-LISS:  Toilet Transfer   Assistance Needed Supervision   Physical Assistance Level No physical assistance or only touching/steadying assist   CARE Score 4   Discharge Goal:  Assistance Needed Independent;Adaptive equipment   Discharge Goal:  Physical Assistance Level No  physical assistance or only touching/steadying assist   Discahrge Goal:  Score 6   IRF-LISS:  Hearing, Speech, and Vision   Expression of Ideas and Wants 4   Understanding Verbal and Non-Verbal Content 4   Problem List   Problem List Decreased Active Daily Living Skills;Decreased Homemaking Skills;Decreased Activity Tolerance  (Double vision/ nausea with both eyes open)   Precautions   Precautions Fall Risk   Current Discharge Plan   Current Discharge Plan Return to Prior Living Situation   Benefit    Therapy Benefit Patient Would Benefit from Inpatient Rehab Occupational Therapy to Maximize [place] with ADLs, IADLs and Functional Mobility.   Interdisciplinary Plan of Care Collaboration   IDT Collaboration with  Family / Caregiver;Nursing   Patient Position at End of Therapy Seated;Family / Friend in Room   Collaboration Comments CLOF, evaluation, communication   Equipment Needs   Assistive Device / DME Grab Bars In Shower / Tub;Grab Bars By Toilet;Shower Chair   Adaptive Equipment Sock Aide   OT Total Time Spent   OT Individual Total Time Spent (Mins) 60   OT Charge Group   OT Self Care / ADL 1   OT Evaluation OT Evaluation Mod       FIM Eating Score:  5 - Standby Prompting/Supervision or Set-up  Eating Description:  Set-up of equipment or meal/tube feeding(Per family SBA for set up)    FIM Grooming Score:  4 - Minimal Assistance  Grooming Description:  (Min A to brush back of hair)    FIM Bathing Score:  5 - Standby Prompting/Supervision or Set-up  Bathing Description:  Adaptive equipment, Grab bar, Tub bench, Hand held shower, Verbal cueing, Supervision for safety    FIM Upper Body Dressin - Minimal Assistance  Upper Body Dressing Description:  Requires assistance with closures    FIM Lower Body Dressing Score:  5 - Standby Prompting/Supervsion or Set-up  Lower Body Dressing Description:  Supervision for safety, Assist device/equipment(SBA for safety 2/2 balance use of grab bar when standing)    FIM  Toileting Body Dressin - Standby Prompting/Supervision or Set-up  Toileting Description:  Adaptive equipment, Grab bar(Per son, SBA for safety with use of grab bars)      FIM Toilet Transfer Score:  5 - Standby Prompting/Supervision or Set-up  Toilet Transfer Description:  Adaptive equipment, Grab bar(Per son, SBA with use of grab bars)    FIM Tub/Shower Transfers Score:  4 - Minimal Assistance  Tub/Shower Transfers Description:  Adaptive equipment, Grab bar, Supervision for safety      Assessment  Patient is 70 y.o. female with a diagnosis of right thalamic CVA.  Additional factors influencing patient status / progress include PMH of DM and HTN. Pt also presents with double vision with both eyes open, delayed pursuit with R eye and nystagmus with superior lateral pursuit. Pt also presents with balance deficits when standing. Pt has supportive family members and currently completes ADLs with Min A - SBA, indicating positive rehabilitation potential.   Plan  Recommend Occupational Therapy  minutes per day 5-7 days per week for 7-10 days for the following treatments:  OT Group Therapy, OT Self Care/ADL, OT Cognitive Skill Dev, OT Community Reintegration, OT Neuro Re-Ed/Balance, OT Sensory Int Techniques, OT Therapeutic Activity, OT Evaluation and OT Therapeutic Exercise.    Goals:  Long term and short term goals have been discussed with patient and they are in agreement.    Occupational Therapy Goals     Problem: Bathing     Dates: Start: 19       Description:     Goal: STG-Within one week, patient will bathe     Dates: Start: 19       Description: 1) Individualized Goal:  Mod I with AE/DME PRN  2) Interventions:  OT Group Therapy, OT Self Care/ADL, OT Cognitive Skill Dev, OT Community Reintegration, OT Neuro Re-Ed/Balance, OT Sensory Int Techniques, OT Therapeutic Activity and OT Evaluation                   Problem: Dressing     Dates: Start: 19       Description:     Goal: STG-Within  one week, patient will dress LB     Dates: Start: 08/21/19       Description: 1) Individualized Goal:  SBA with AE/DME PRN  2) Interventions:  OT Group Therapy, OT Self Care/ADL, OT Cognitive Skill Dev, OT Community Reintegration, OT Neuro Re-Ed/Balance, OT Sensory Int Techniques, OT Therapeutic Activity and OT Evaluation                   Problem: Grooming     Dates: Start: 08/21/19       Description:     Goal: STG-Within one week, patient will complete grooming     Dates: Start: 08/21/19       Description: 1) Individualized Goal:  SBA with AE/DME PRN standing at sink  2) Interventions:  OT Group Therapy, OT Self Care/ADL, OT Cognitive Skill Dev, OT Community Reintegration, OT Neuro Re-Ed/Balance, OT Sensory Int Techniques, OT Therapeutic Activity and OT Evaluation                   Problem: OT Long Term Goals     Dates: Start: 08/21/19       Description:     Goal: LTG-By discharge, patient will complete basic self care tasks     Dates: Start: 08/21/19       Description: 1) Individualized Goal:  Mod I with AE/DME PRN  2) Interventions:  OT Group Therapy, OT Self Care/ADL, OT Cognitive Skill Dev, OT Community Reintegration, OT Neuro Re-Ed/Balance, OT Sensory Int Techniques, OT Therapeutic Activity and OT Evaluation             Goal: LTG-By discharge, patient will perform bathroom transfers     Dates: Start: 08/21/19       Description: 1) Individualized Goal:  Mod I with AE/DME PRN  2) Interventions:  OT Group Therapy, OT Self Care/ADL, OT Cognitive Skill Dev, OT Community Reintegration, OT Neuro Re-Ed/Balance, OT Sensory Int Techniques, OT Therapeutic Activity and OT Evaluation

## 2023-01-04 ENCOUNTER — TELEMEDICINE (OUTPATIENT)
Dept: FAMILY MEDICINE CLINIC | Age: 23
End: 2023-01-04
Payer: COMMERCIAL

## 2023-01-04 DIAGNOSIS — K90.0 CELIAC DISEASE: ICD-10-CM

## 2023-01-04 DIAGNOSIS — F41.9 ANXIETY: Primary | ICD-10-CM

## 2023-01-04 DIAGNOSIS — J45.20 MILD INTERMITTENT ASTHMA WITHOUT COMPLICATION: ICD-10-CM

## 2023-01-04 PROCEDURE — 99214 OFFICE O/P EST MOD 30 MIN: CPT | Performed by: INTERNAL MEDICINE

## 2023-01-04 RX ORDER — LEVALBUTEROL TARTRATE 45 UG/1
2 AEROSOL, METERED ORAL EVERY 6 HOURS PRN
Qty: 1 EACH | Refills: 3 | Status: SHIPPED | OUTPATIENT
Start: 2023-01-04

## 2023-01-04 RX ORDER — CLONAZEPAM 0.5 MG/1
0.5 TABLET ORAL NIGHTLY PRN
Qty: 90 TABLET | Refills: 1 | Status: SHIPPED | OUTPATIENT
Start: 2023-01-04 | End: 2023-07-03

## 2023-01-04 RX ORDER — SERTRALINE HYDROCHLORIDE 100 MG/1
100 TABLET, FILM COATED ORAL DAILY
Qty: 30 TABLET | Refills: 4 | Status: SHIPPED | OUTPATIENT
Start: 2023-01-04

## 2023-01-04 NOTE — PROGRESS NOTES
[facility] PRIMARY CARE  [address] [place] [address]  [place] [address]  Dept: [phone]  Dept Fax: [phone]     NAME: Sandro Gulilermo        :  2000        MRN:  [id number]    Chief Complaint   Patient presents with    Anxiety    Depression         Subjective     History of Present Illness  Sandro Guillermo is a 20 y.o. female who presents today for a virtual visit. Feels as though her medications need to be adjusted as her anxiety is worse in the evenings. She is doing well at her job in [place], however reports the apartment she is in is not great. Review of Systems  Please see HPI above. All bolded are positive. Gen: fever, chills, fatigue, weakness, diaphoresis, unintentional weight change  Head: headache, vision change, hearing loss  Chest: chest pain/heaviness, palpitations  Lungs: shortness of breath, wheezing, coughing, hemoptysis  Abdomen: abdominal pain, nausea, vomiting, diarrhea, constipation, melena, hematochezia, hematemesis, loss of appetite  Extremities: lower extremity edema, myalgias, arthralgias  Urinary: dysuria, hematuria, weak flow, increase in frequency  Neurologic: lightheadedness, dizziness, confusion, syncope  Endocrine: polydipsia, polyuria, heat or cold intolerance  Psychiatric: depression, suicidal ideation, anxiety  Derm: Rashes, ulcers, burns    Past Medical Hx:  No past medical history on file. Past Surgical Hx:  No past surgical history on file. Family Hx:  No family history on file. Social Hx:  Social History     Tobacco Use    Smoking status: Never    Smokeless tobacco: Never   Substance Use Topics    Alcohol use: Yes     Comment: wine       Home Medications:  Current Outpatient Medications   Medication Sig Dispense Refill    clonazePAM (KLONOPIN) 0.5 MG tablet Take 1 tablet by mouth nightly as needed for Anxiety for up to 180 days.  Max Daily Amount: 0.5 mg 90 tablet 1    sertraline (ZOLOFT) 100 MG tablet Take 1 tablet by mouth daily 30 tablet 4    levalbuterol (XOPENEX HFA) 45 MCG/ACT inhaler Inhale 2 puffs into the lungs every 6 hours as needed for Wheezing 1 each 3    BLISOVI 24 FE 1-20 MG-MCG(24) TABS TAKE 1 TABLET BY MOUTH EVERY DAY FOR CONTINUOUS CYCLE SKIPPING INACTIVE PILLS. No current facility-administered medications for this visit. Allergies: Allergies   Allergen Reactions    Amitriptyline Other (See Comments)     Tachycardia from elavil     Amoxicillin      rash    Gluten Meal Other (See Comments)    Penicillins     Sulfa Antibiotics      Drops in eye, pt was young       Objective     Patient-Reported Vitals 1/4/2023   Patient-Reported Weight 130   Patient-Reported Height 5'6\"   Patient-Reported Pulse 85       Physical Exam (limited due to video visit)  General: Awake, alert, and oriented to person, place, time, and purpose, appears stated age and cooperative, No acute distress  Head: Normocephalic  Eyes: EOMI, conjunctiva is clear   Neck: supple, trachea midline  Back: symmetric, ROM normal  Lungs: no apparent respiratory distress, no coughing  Extremities: atraumatic, no cyanosis, no edema  Skin: No obvious rashes or lesions   Neurologic:speech appropriate, moves all 4 extremities, CN 2-12 grossly intact    Labs:  Lab Results   Component Value Date    WBC 9.4 04/26/2019    HGB 13.1 04/26/2019    HCT 37.4 04/26/2019     04/26/2019     04/26/2019    K 4.1 04/26/2019     04/26/2019    CREATININE 0.75 04/26/2019    BUN 15 04/26/2019    CO2 27 04/26/2019    GLUCOSE 93 04/26/2019    ALT 14 04/26/2019    AST 16 04/26/2019     No results found for: TSH  No results found for: TRIG  No results found for: HDL  No results found for: LDLCALC  No results found for: LABA1C  No results found for: INR, PROTIME   *All recent labs were reviewed. Please see electronic chart for a more comprehensive set of labs    Radiology:  No results found.     Assessment and Plan     Patient is a 20 y.o. female who presented for virtual visit. Full problem list is as follows:  Patient Active Problem List   Diagnosis    Anxiety    Uncomplicated asthma       Cleo Perez was seen today for anxiety and depression. Diagnoses and all orders for this visit:    Anxiety  -     clonazePAM (KLONOPIN) 0.5 MG tablet; Take 1 tablet by mouth nightly as needed for Anxiety for up to 180 days. Max Daily Amount: 0.5 mg  -     sertraline (ZOLOFT) 100 MG tablet; Take 1 tablet by mouth daily  -     CBC with Auto Differential; Future  -     Comprehensive Metabolic Panel; Future  -     TSH; Future  -     Vitamin D 25 Hydroxy; Future  -     Vitamin B12 & Folate; Future  -     Iron and TIBC; Future  - uncontrolled, will increase dose of the klonopin     Celiac disease  -     TISSUE TRANSGLUTAMINASE, IGA; Future    Mild intermittent asthma without complication  -     levalbuterol (XOPENEX HFA) 45 MCG/ACT inhaler; Inhale 2 puffs into the lungs every 6 hours as needed for Wheezing      Educational materials and/or home exercises printed for patient's review and were included in patient instructions on his/her After Visit Summary and given to patient at the end of visit. Counseled regarding above diagnosis, including possible risks and complications, especially if left uncontrolled. Counseled regarding the possible side effects, risks, benefits and alternatives to treatment; patient and/or guardian verbalizes understanding, agrees, feels comfortable with and wishes to proceed with above treatment plan. Advised patient to call [facility]cho[facility] new medication issues, and read all Rx info from pharmacy to assure aware of all possible risks and side effects of any medication before taking. Patient verbalizes understanding and agrees with above counseling, assessment and plan. All questions answered.     TeleMedicine Patient Consent    This visit was performed as a virtual video visit using a synchronous, two-way, audio-video telehealth technology platform. Patient identification was verified at the start of the visit, including the patient's telephone number and physical location. I discussed with the patient the nature of our telehealth visits, that:     Due to the nature of an audio- video modality, the only components of a physical exam that could be done are the elements supported by direct observation. I would evaluate the patient and recommend diagnostics and treatments based on my assessment. If it was felt that the patient should be evaluated in clinic or an emergency room setting, then they would be directed there. Our sessions are not being recorded and that personal health information is protected. Our team would provide follow up care in person if/when the patient needs it. Patient does agree to proceed with telemedicine consultation. Patient's location: home address in [facility]    Physician location: [facility]  Other people involved in call: none      This visit was completed virtually using My Chart/Haiku/Karly.       Piedad Morgan DO

## 2023-04-02 DIAGNOSIS — F41.9 ANXIETY: ICD-10-CM

## 2023-04-03 RX ORDER — SERTRALINE HYDROCHLORIDE 100 MG/1
TABLET, FILM COATED ORAL
Qty: 90 TABLET | Refills: 1 | Status: SHIPPED | OUTPATIENT
Start: 2023-04-03

## 2023-04-07 DIAGNOSIS — K90.0 CELIAC DISEASE: ICD-10-CM

## 2023-04-07 DIAGNOSIS — F41.9 ANXIETY: ICD-10-CM

## 2023-04-07 LAB
ALBUMIN SERPL-MCNC: 4.3 G/DL (ref 3.5–5.2)
ALP SERPL-CCNC: 38 U/L (ref 35–104)
ALT SERPL-CCNC: 16 U/L (ref 0–32)
ANION GAP SERPL CALCULATED.3IONS-SCNC: 17 MMOL/L (ref 7–16)
AST SERPL-CCNC: 29 U/L (ref 0–31)
BASOPHILS # BLD: 0.05 E9/L (ref 0–0.2)
BASOPHILS NFR BLD: 0.6 % (ref 0–2)
BILIRUB SERPL-MCNC: 0.4 MG/DL (ref 0–1.2)
BUN SERPL-MCNC: 17 MG/DL (ref 6–20)
CALCIUM SERPL-MCNC: 9.8 MG/DL (ref 8.6–10.2)
CHLORIDE SERPL-SCNC: 102 MMOL/L (ref 98–107)
CO2 SERPL-SCNC: 22 MMOL/L (ref 22–29)
CREAT SERPL-MCNC: 0.8 MG/DL (ref 0.5–1)
EOSINOPHIL # BLD: 0.29 E9/L (ref 0.05–0.5)
EOSINOPHIL NFR BLD: 3.4 % (ref 0–6)
ERYTHROCYTE [DISTWIDTH] IN BLOOD BY AUTOMATED COUNT: 12.4 FL (ref 11.5–15)
FOLATE SERPL-MCNC: 7.6 NG/ML (ref 4.8–24.2)
GLUCOSE SERPL-MCNC: 100 MG/DL (ref 74–99)
HCT VFR BLD AUTO: 37.8 % (ref 34–48)
HGB BLD-MCNC: 13 G/DL (ref 11.5–15.5)
IMM GRANULOCYTES # BLD: 0.02 E9/L
IMM GRANULOCYTES NFR BLD: 0.2 % (ref 0–5)
IRON SATN MFR SERPL: 22 % (ref 15–50)
IRON SERPL-MCNC: 81 MCG/DL (ref 37–145)
LYMPHOCYTES # BLD: 2.11 E9/L (ref 1.5–4)
LYMPHOCYTES NFR BLD: 25.1 % (ref 20–42)
MCH RBC QN AUTO: 32.2 PG (ref 26–35)
MCHC RBC AUTO-ENTMCNC: 34.4 % (ref 32–34.5)
MCV RBC AUTO: 93.6 FL (ref 80–99.9)
MONOCYTES # BLD: 0.78 E9/L (ref 0.1–0.95)
MONOCYTES NFR BLD: 9.3 % (ref 2–12)
NEUTROPHILS # BLD: 5.16 E9/L (ref 1.8–7.3)
NEUTS SEG NFR BLD: 61.4 % (ref 43–80)
PLATELET # BLD AUTO: 288 E9/L (ref 130–450)
PMV BLD AUTO: 10.5 FL (ref 7–12)
POTASSIUM SERPL-SCNC: 4.8 MMOL/L (ref 3.5–5)
PROT SERPL-MCNC: 6.9 G/DL (ref 6.4–8.3)
RBC # BLD AUTO: 4.04 E12/L (ref 3.5–5.5)
SODIUM SERPL-SCNC: 141 MMOL/L (ref 132–146)
TIBC SERPL-MCNC: 366 MCG/DL (ref 250–450)
TSH SERPL-MCNC: 2.32 UIU/ML (ref 0.27–4.2)
VIT B12 SERPL-MCNC: 417 PG/ML (ref 211–946)
VITAMIN D 25-HYDROXY: 44 NG/ML (ref 30–100)
WBC # BLD: 8.4 E9/L (ref 4.5–11.5)

## 2023-04-14 LAB — TTG IGA SER IA-ACNC: 0.8 U/ML

## 2023-10-16 DIAGNOSIS — F41.9 ANXIETY: ICD-10-CM

## 2023-10-16 RX ORDER — SERTRALINE HYDROCHLORIDE 100 MG/1
TABLET, FILM COATED ORAL
Qty: 90 TABLET | Refills: 1 | Status: SHIPPED | OUTPATIENT
Start: 2023-10-16

## 2023-12-05 DIAGNOSIS — F41.9 ANXIETY: ICD-10-CM

## 2023-12-06 RX ORDER — CLONAZEPAM 0.5 MG/1
0.5 TABLET ORAL NIGHTLY PRN
Qty: 30 TABLET | Refills: 0 | Status: SHIPPED | OUTPATIENT
Start: 2023-12-06 | End: 2024-01-05

## 2024-02-01 ASSESSMENT — PATIENT HEALTH QUESTIONNAIRE - PHQ9
1. LITTLE INTEREST OR PLEASURE IN DOING THINGS: SEVERAL DAYS
1. LITTLE INTEREST OR PLEASURE IN DOING THINGS: 1
SUM OF ALL RESPONSES TO PHQ9 QUESTIONS 1 & 2: 2
SUM OF ALL RESPONSES TO PHQ QUESTIONS 1-9: 2
SUM OF ALL RESPONSES TO PHQ9 QUESTIONS 1 & 2: 2
SUM OF ALL RESPONSES TO PHQ QUESTIONS 1-9: 2
2. FEELING DOWN, DEPRESSED OR HOPELESS: SEVERAL DAYS

## 2024-02-08 ENCOUNTER — OFFICE VISIT (OUTPATIENT)
Dept: FAMILY MEDICINE CLINIC | Age: 24
End: 2024-02-08
Payer: COMMERCIAL

## 2024-02-08 VITALS
BODY MASS INDEX: 21.98 KG/M2 | TEMPERATURE: 97.3 F | HEART RATE: 95 BPM | WEIGHT: 136.8 LBS | SYSTOLIC BLOOD PRESSURE: 90 MMHG | DIASTOLIC BLOOD PRESSURE: 60 MMHG | HEIGHT: 66 IN | OXYGEN SATURATION: 98 %

## 2024-02-08 DIAGNOSIS — Z00.00 ENCOUNTER FOR WELL ADULT EXAM WITHOUT ABNORMAL FINDINGS: Primary | ICD-10-CM

## 2024-02-08 DIAGNOSIS — F41.9 ANXIETY: ICD-10-CM

## 2024-02-08 DIAGNOSIS — J45.20 MILD INTERMITTENT ASTHMA WITHOUT COMPLICATION: ICD-10-CM

## 2024-02-08 PROBLEM — F41.1 GENERALIZED ANXIETY DISORDER: Status: ACTIVE | Noted: 2019-01-08

## 2024-02-08 PROBLEM — J45.909 UNCOMPLICATED ASTHMA: Status: RESOLVED | Noted: 2021-10-29 | Resolved: 2024-02-08

## 2024-02-08 PROBLEM — F41.1 GENERALIZED ANXIETY DISORDER: Status: RESOLVED | Noted: 2019-01-08 | Resolved: 2024-02-08

## 2024-02-08 PROBLEM — F84.0 AUTISM SPECTRUM DISORDER REQUIRING SUPPORT (LEVEL 1): Status: ACTIVE | Noted: 2019-04-24

## 2024-02-08 PROCEDURE — 99395 PREV VISIT EST AGE 18-39: CPT | Performed by: INTERNAL MEDICINE

## 2024-02-08 RX ORDER — SERTRALINE HYDROCHLORIDE 100 MG/1
100 TABLET, FILM COATED ORAL DAILY
Qty: 90 TABLET | Refills: 3 | Status: SHIPPED | OUTPATIENT
Start: 2024-02-08

## 2024-02-08 RX ORDER — CLONAZEPAM 0.5 MG/1
0.5 TABLET ORAL NIGHTLY PRN
Qty: 90 TABLET | Refills: 3 | Status: SHIPPED | OUTPATIENT
Start: 2024-02-08 | End: 2025-02-02

## 2024-02-08 SDOH — ECONOMIC STABILITY: FOOD INSECURITY: WITHIN THE PAST 12 MONTHS, THE FOOD YOU BOUGHT JUST DIDN'T LAST AND YOU DIDN'T HAVE MONEY TO GET MORE.: NEVER TRUE

## 2024-02-08 SDOH — ECONOMIC STABILITY: FOOD INSECURITY: WITHIN THE PAST 12 MONTHS, YOU WORRIED THAT YOUR FOOD WOULD RUN OUT BEFORE YOU GOT MONEY TO BUY MORE.: NEVER TRUE

## 2024-02-08 SDOH — ECONOMIC STABILITY: INCOME INSECURITY: HOW HARD IS IT FOR YOU TO PAY FOR THE VERY BASICS LIKE FOOD, HOUSING, MEDICAL CARE, AND HEATING?: NOT HARD AT ALL

## 2024-02-08 SDOH — ECONOMIC STABILITY: HOUSING INSECURITY
IN THE LAST 12 MONTHS, WAS THERE A TIME WHEN YOU DID NOT HAVE A STEADY PLACE TO SLEEP OR SLEPT IN A SHELTER (INCLUDING NOW)?: NO

## 2024-02-08 NOTE — PATIENT INSTRUCTIONS
[facility]   [email or web address]     [place], [place]  [address], [address]  [place] [address]  Phone: [phone]  Fax: [phone]        Well Visit, Ages 18 to 65: Care Instructions  Well visits can help you stay healthy. Your doctor has checked your overall health and may have suggested ways to take good care of yourself. Your doctor also may have recommended tests. You can help prevent illness with healthy eating, good sleep, vaccinations, regular exercise, and other steps.    Get the tests that you and your doctor decide on. Depending on your age and risks, examples might include screening for diabetes; hepatitis C; HIV; and cervical, breast, lung, and colon cancer. Screening helps find diseases before any symptoms appear.   Eat healthy foods. Choose fruits, vegetables, whole grains, lean protein, and low-fat dairy foods. Limit saturated fat and reduce salt.     Limit alcohol. Men should have no more than 2 drinks a day. Women should have no more than 1. For some people, no alcohol is the best choice.   Exercise. Get at least 30 minutes of exercise on most days of the week. Walking can be a good choice.     Reach and stay at your healthy weight. This will lower your risk for many health problems.   Take care of your mental health. Try to stay connected with friends, family, and community, and find ways to manage stress.     If you're feeling depressed or hopeless, talk to someone. A counselor can help. If you don't have a counselor, talk to your doctor.   Talk to your doctor if you think you may have a problem with alcohol or drug use. This includes prescription medicines, marijuana, and other drugs.     Avoid tobacco and nicotine: Don't smoke, vape, or chew. If you need help quitting, talk to your doctor.   Practice safer sex. Getting tested, using condoms or dental dams, and limiting sex partners can help prevent STIs.     Use birth control if it's important to you to prevent

## 2024-02-08 NOTE — PROGRESS NOTES
[facility]  [facility] PRIMARY CARE  [address] [address]  Dept: [phone]  Dept Fax: [phone]     NAME: Helen Puente        :  2000        MRN:  [id number]    Chief Complaint   Patient presents with    Annual Exam     HPV vaccine       Subjective     History of Present Illness  Helen Puente is a 23 y.o. female who presents today for routine annual follow up and medication refill.         Review of Systems  Please see HPI above. Comprehensive review of systems negative unless otherwise noted above.    Past Medical Hx:  History reviewed. No pertinent past medical history.    Past Surgical Hx:  No past surgical history on file.    Family Hx:  No family history on file.    Social Hx:  Social History     Tobacco Use    Smoking status: Never    Smokeless tobacco: Never   Substance Use Topics    Alcohol use: Yes     Comment: wine       Home Medications:  Current Outpatient Medications   Medication Sig Dispense Refill    clonazePAM (KLONOPIN) 0.5 MG tablet Take 1 tablet by mouth nightly as needed for Anxiety for up to 360 days. Max Daily Amount: 0.5 mg 90 tablet 3    sertraline (ZOLOFT) 100 MG tablet Take 1 tablet by mouth daily 90 tablet 3    levalbuterol (XOPENEX HFA) 45 MCG/ACT inhaler Inhale 2 puffs into the lungs every 6 hours as needed for Wheezing 1 each 3    BLISOVI 24 FE 1-20 MG-MCG(24) TABS TAKE 1 TABLET BY MOUTH EVERY DAY FOR CONTINUOUS CYCLE SKIPPING INACTIVE PILLS.       No current facility-administered medications for this visit.        Allergies:  Allergies   Allergen Reactions    Amitriptyline Other (See Comments)     Tachycardia from elavil     Amoxicillin      rash    Gluten Meal Other (See Comments)    Penicillins     Sulfa Antibiotics      Drops in eye, pt was young       Objective     Vitals:    24 1235   BP: 90/60   Pulse: 95   Temp: 97.3 °F (36.3 °C)   TempSrc: Temporal   SpO2: 98%   Weight: 62.1 kg (136 lb 12.8 oz)   Height:

## 2024-09-20 ENCOUNTER — TELEPHONE (OUTPATIENT)
Dept: FAMILY MEDICINE CLINIC | Age: 24
End: 2024-09-20

## 2025-03-17 SDOH — HEALTH STABILITY: PHYSICAL HEALTH: ON AVERAGE, HOW MANY DAYS PER WEEK DO YOU ENGAGE IN MODERATE TO STRENUOUS EXERCISE (LIKE A BRISK WALK)?: 2 DAYS

## 2025-03-17 SDOH — HEALTH STABILITY: PHYSICAL HEALTH: ON AVERAGE, HOW MANY MINUTES DO YOU ENGAGE IN EXERCISE AT THIS LEVEL?: 30 MIN

## 2025-03-18 ENCOUNTER — OFFICE VISIT (OUTPATIENT)
Dept: FAMILY MEDICINE CLINIC | Age: 25
End: 2025-03-18

## 2025-03-18 VITALS
BODY MASS INDEX: 23.14 KG/M2 | DIASTOLIC BLOOD PRESSURE: 89 MMHG | TEMPERATURE: 97.4 F | SYSTOLIC BLOOD PRESSURE: 110 MMHG | OXYGEN SATURATION: 98 % | RESPIRATION RATE: 15 BRPM | HEIGHT: 66 IN | HEART RATE: 108 BPM | WEIGHT: 144 LBS

## 2025-03-18 DIAGNOSIS — M25.551 RIGHT HIP PAIN: ICD-10-CM

## 2025-03-18 DIAGNOSIS — N94.2 VAGINISMUS: ICD-10-CM

## 2025-03-18 DIAGNOSIS — F41.9 ANXIETY: ICD-10-CM

## 2025-03-18 DIAGNOSIS — F32.A DEPRESSION, UNSPECIFIED DEPRESSION TYPE: ICD-10-CM

## 2025-03-18 DIAGNOSIS — R00.0 TACHYCARDIA: ICD-10-CM

## 2025-03-18 RX ORDER — LEVONORGESTREL 52 MG/1
1 INTRAUTERINE DEVICE INTRAUTERINE ONCE
COMMUNITY
Start: 2025-01-21

## 2025-03-18 RX ORDER — SERTRALINE HYDROCHLORIDE 100 MG/1
100 TABLET, FILM COATED ORAL DAILY
Qty: 90 TABLET | Refills: 3 | Status: SHIPPED | OUTPATIENT
Start: 2025-03-18

## 2025-03-18 SDOH — ECONOMIC STABILITY: FOOD INSECURITY: WITHIN THE PAST 12 MONTHS, YOU WORRIED THAT YOUR FOOD WOULD RUN OUT BEFORE YOU GOT MONEY TO BUY MORE.: NEVER TRUE

## 2025-03-18 SDOH — ECONOMIC STABILITY: FOOD INSECURITY: WITHIN THE PAST 12 MONTHS, THE FOOD YOU BOUGHT JUST DIDN'T LAST AND YOU DIDN'T HAVE MONEY TO GET MORE.: NEVER TRUE

## 2025-03-18 ASSESSMENT — PATIENT HEALTH QUESTIONNAIRE - PHQ9
SUM OF ALL RESPONSES TO PHQ QUESTIONS 1-9: 12
SUM OF ALL RESPONSES TO PHQ QUESTIONS 1-9: 12
9. THOUGHTS THAT YOU WOULD BE BETTER OFF DEAD, OR OF HURTING YOURSELF: NOT AT ALL
1. LITTLE INTEREST OR PLEASURE IN DOING THINGS: MORE THAN HALF THE DAYS
SUM OF ALL RESPONSES TO PHQ QUESTIONS 1-9: 12
2. FEELING DOWN, DEPRESSED OR HOPELESS: MORE THAN HALF THE DAYS
6. FEELING BAD ABOUT YOURSELF - OR THAT YOU ARE A FAILURE OR HAVE LET YOURSELF OR YOUR FAMILY DOWN: NOT AT ALL
5. POOR APPETITE OR OVEREATING: NOT AT ALL
7. TROUBLE CONCENTRATING ON THINGS, SUCH AS READING THE NEWSPAPER OR WATCHING TELEVISION: NEARLY EVERY DAY
4. FEELING TIRED OR HAVING LITTLE ENERGY: NEARLY EVERY DAY
3. TROUBLE FALLING OR STAYING ASLEEP: MORE THAN HALF THE DAYS
8. MOVING OR SPEAKING SO SLOWLY THAT OTHER PEOPLE COULD HAVE NOTICED. OR THE OPPOSITE, BEING SO FIGETY OR RESTLESS THAT YOU HAVE BEEN MOVING AROUND A LOT MORE THAN USUAL: NOT AT ALL
10. IF YOU CHECKED OFF ANY PROBLEMS, HOW DIFFICULT HAVE THESE PROBLEMS MADE IT FOR YOU TO DO YOUR WORK, TAKE CARE OF THINGS AT HOME, OR GET ALONG WITH OTHER PEOPLE: VERY DIFFICULT
SUM OF ALL RESPONSES TO PHQ QUESTIONS 1-9: 12

## 2025-03-18 ASSESSMENT — ANXIETY QUESTIONNAIRES
1. FEELING NERVOUS, ANXIOUS, OR ON EDGE: NEARLY EVERY DAY
3. WORRYING TOO MUCH ABOUT DIFFERENT THINGS: MORE THAN HALF THE DAYS
7. FEELING AFRAID AS IF SOMETHING AWFUL MIGHT HAPPEN: SEVERAL DAYS
4. TROUBLE RELAXING: SEVERAL DAYS
GAD7 TOTAL SCORE: 11
5. BEING SO RESTLESS THAT IT IS HARD TO SIT STILL: NOT AT ALL
2. NOT BEING ABLE TO STOP OR CONTROL WORRYING: MORE THAN HALF THE DAYS
6. BECOMING EASILY ANNOYED OR IRRITABLE: MORE THAN HALF THE DAYS
IF YOU CHECKED OFF ANY PROBLEMS ON THIS QUESTIONNAIRE, HOW DIFFICULT HAVE THESE PROBLEMS MADE IT FOR YOU TO DO YOUR WORK, TAKE CARE OF THINGS AT HOME, OR GET ALONG WITH OTHER PEOPLE: VERY DIFFICULT

## 2025-03-18 NOTE — PROGRESS NOTES
[facility] Medicine Residency Program    CC: Helen Puente is a 24 y.o. yo female is here for evaluation evaluation for the following medical concerns: New Patient, Medication Refill, and Other (Pych referral)      HPI:    Here to establish.    Anxiety, depression, hx medical trauma: sertraline 100 mg daily; discussed referral to psychiatrist with previous pcp; worse in winter, current lack of motivation, has light therapy she will restart using  Been on sertraline ~7 years, been on 100 mg dose for several years, was in therapy, wants to be in therapy again  Reports used to take clonazepam 0.5 mg nightly as needed for waking up at night throwing up 2/2 anxiety, been off for awhile, doing okay  Denies thoughts of SI, HI; denies hx self harm or previous hospitalizations related to anxiety or depression  Will check her oxygen at home to make sure it doesn't drop with anxiety or asthma    KENNY 7 = 11      3/18/2025     9:11 AM 2/1/2024     1:05 PM 5/4/2022     3:16 PM   PHQ-9    Little interest or pleasure in doing things 2 1 2   Feeling down, depressed, or hopeless 2 1 0   Trouble falling or staying asleep, or sleeping too much 2     Feeling tired or having little energy 3     Poor appetite or overeating 0     Feeling bad about yourself - or that you are a failure or have let yourself or your family down 0     Trouble concentrating on things, such as reading the newspaper or watching television 3     Moving or speaking so slowly that other people could have noticed. Or the opposite - being so fidgety or restless that you have been moving around a lot more than usual 0     Thoughts that you would be better off dead, or of hurting yourself in some way 0     PHQ-2 Score 4 2 2   PHQ-9 Total Score 12 2 2   If you checked off any problems, how difficult have these problems made it for you to do your work, take care of things at home, or get along with other people? 2       Tachycardia: Patient reports feeling like 
Statement  I have discussed the case, including pertinent history and exam findings with the resident. I also have seen the patient and performed key portions of the examination.  I agree with the documented assessment and plan.      Edis Be, DO

## 2025-05-19 ENCOUNTER — OFFICE VISIT (OUTPATIENT)
Dept: FAMILY MEDICINE CLINIC | Age: 25
End: 2025-05-19
Payer: COMMERCIAL

## 2025-05-19 VITALS
WEIGHT: 146 LBS | HEART RATE: 81 BPM | BODY MASS INDEX: 23.46 KG/M2 | DIASTOLIC BLOOD PRESSURE: 73 MMHG | SYSTOLIC BLOOD PRESSURE: 104 MMHG | OXYGEN SATURATION: 98 % | TEMPERATURE: 97.7 F | RESPIRATION RATE: 15 BRPM | HEIGHT: 66 IN

## 2025-05-19 DIAGNOSIS — F41.9 ANXIETY: ICD-10-CM

## 2025-05-19 DIAGNOSIS — J30.2 SEASONAL ALLERGIES: Primary | ICD-10-CM

## 2025-05-19 DIAGNOSIS — G90.A POTS (POSTURAL ORTHOSTATIC TACHYCARDIA SYNDROME): ICD-10-CM

## 2025-05-19 DIAGNOSIS — F32.A DEPRESSION, UNSPECIFIED DEPRESSION TYPE: ICD-10-CM

## 2025-05-19 PROCEDURE — 99213 OFFICE O/P EST LOW 20 MIN: CPT

## 2025-05-19 ASSESSMENT — PATIENT HEALTH QUESTIONNAIRE - PHQ9
3. TROUBLE FALLING OR STAYING ASLEEP: NOT AT ALL
SUM OF ALL RESPONSES TO PHQ QUESTIONS 1-9: 6
9. THOUGHTS THAT YOU WOULD BE BETTER OFF DEAD, OR OF HURTING YOURSELF: NOT AT ALL
2. FEELING DOWN, DEPRESSED OR HOPELESS: SEVERAL DAYS
7. TROUBLE CONCENTRATING ON THINGS, SUCH AS READING THE NEWSPAPER OR WATCHING TELEVISION: SEVERAL DAYS
6. FEELING BAD ABOUT YOURSELF - OR THAT YOU ARE A FAILURE OR HAVE LET YOURSELF OR YOUR FAMILY DOWN: NOT AT ALL
SUM OF ALL RESPONSES TO PHQ QUESTIONS 1-9: 6
1. LITTLE INTEREST OR PLEASURE IN DOING THINGS: SEVERAL DAYS
SUM OF ALL RESPONSES TO PHQ QUESTIONS 1-9: 6
5. POOR APPETITE OR OVEREATING: SEVERAL DAYS
8. MOVING OR SPEAKING SO SLOWLY THAT OTHER PEOPLE COULD HAVE NOTICED. OR THE OPPOSITE, BEING SO FIGETY OR RESTLESS THAT YOU HAVE BEEN MOVING AROUND A LOT MORE THAN USUAL: NOT AT ALL
4. FEELING TIRED OR HAVING LITTLE ENERGY: MORE THAN HALF THE DAYS
SUM OF ALL RESPONSES TO PHQ QUESTIONS 1-9: 6

## 2025-05-19 NOTE — PROGRESS NOTES
[facility] Medicine Residency Program    CC: Helen Puente is a 24 y.o. yo female is here for evaluation evaluation for the following medical concerns: Anxiety      HPI:    Anxiety, depression, hx medical trauma:   Takes sertraline 100 mg daily; worse in winter, current lack of motivation, has light therapy she will restart using  Been on sertraline ~7 years, been on 100 mg dose for several years, was in therapy, wants to be in therapy again  Reports used to take clonazepam 0.5 mg nightly as needed for waking up at night throwing up 2/2 anxiety, been off for awhile, doing okay, informed patient last visit I will not be refilling this   Denies thoughts of SI, HI; denies hx self harm or previous hospitalizations related to anxiety or depression  Will check her oxygen at home to make sure it doesn't drop with anxiety or asthma  Last visit I refilled her sertraline and provided list of trauma informed counselors which patient is agreeable to call and get established with   She reports she established with [facility] trauma counselors, Malinda Liriano, which is helping so far  KENNY 7 = 4  PHQ-9 = 6    POTS:   Patient reports feeling like her heart rate jumps when she goes from sitting to standing, will feel lightheaded, denies syncopal episodes  Increased fluid intake to 2 to 3 L daily, salt goal 0895-9758 mg daily, compression leggings  Reports symptoms have improved    \"Head rush:\"  Reports \"head rush\" feeling at night 6 times after increasing fluid and salt intake  Pre-syncopal feeling has improved  \"Head rush\" happens when she's sitting, \"feels like her head is going to float away from her,\" freaked her out the first few times  Denies head trauma, headache, uncertain if her vision changes, sweating, nausea, vomiting, chest pain, trouble breathing  Denies hx migraines, but has significant family hx of migraines  Denies recent medication changes        HCM:  Pap 8/2024 negative for cell changes, Dr. Augie CARDONA as

## 2025-05-19 NOTE — PATIENT INSTRUCTIONS
Please bring journal of symptoms to next appt.     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 I'm excited to announce I will be joining [facility] starting in September.     [address] [place], [place] [address]  Tel: [phone]    Until then, continue to call [facility] ([phone]) to make an appointment or request medication refills.     Sae,   Dr. Cerda

## 2025-05-19 NOTE — PROGRESS NOTES
[facility]  Precepting Note    Subjective:      5/19/2025     9:49 AM 3/18/2025     9:39 AM 1/29/2021    11:00 AM   KENNY-7 SCREENING   Feeling nervous, anxious, or on edge Several days Nearly every day    Not being able to stop or control worrying Several days More than half the days    Worrying too much about different things Several days More than half the days    Trouble relaxing Not at all Several days    Being so restless that it is hard to sit still Not at all Not at all    Becoming easily annoyed or irritable Several days More than half the days    Feeling afraid as if something awful might happen Not at all Several days    KENNY-7 Total Score 4 11    How difficult have these problems made it for you to do your work, take care of things at home, or get along with other people? Somewhat difficult Very difficult    Feeling nervous, anxious, or on edge   1-Several days   Not able to stop or control worrying?   3-Nearly every day   Worrying too much about different things   3-Nearly every day   Trouble relaxing   1-Several days   Being so restless that it's hard to sit still   3-Nearly every day   Becoming easily annoyed or irritable   3-Nearly every day   Feeling afraid as if something awful might happen   1-Several days   KENNY-7 Total Score   15         5/19/2025     9:50 AM 3/18/2025     9:11 AM 2/1/2024     1:05 PM   PHQ-9    Little interest or pleasure in doing things 1 2 1   Feeling down, depressed, or hopeless 1 2 1   Trouble falling or staying asleep, or sleeping too much 0 2    Feeling tired or having little energy 2 3    Poor appetite or overeating 1 0    Feeling bad about yourself - or that you are a failure or have let yourself or your family down 0 0    Trouble concentrating on things, such as reading the newspaper or watching television 1 3    Moving or speaking so slowly that other people could have noticed. Or the opposite - being so fidgety or restless that you

## 2025-07-17 NOTE — PROGRESS NOTES
[facility]  Department of Family Medicine  Family Medicine Residency Program      Patient: Helen Puente 24 y.o. female     Date of Service: 7/17/25      Chief complaint:   Chief Complaint   Patient presents with    Abdominal Pain     LLQ x 5 days and URQ recurrent pain    Dizziness    Health Maintenance     Declined vaccines       HISTORY OF PRESENTING ILLNESS     24 y.o. female PMH anxiety, tachycardia, depression, vaginismus presented to the clinic for the complaints above:    Ruq pain  Has chronic right upper quadrant pain  Used to follow with gastroenterology  Had her first endoscopy at the age of 16, was told by the doctor that she just has anxiety  Had a blood work that was concerning for celiac disease, patient has a family history of celiac.  Underwent another endoscopy evaluation at the age of approximately 19 years old.  She was told that she does not have celiac's.  Antitissue transglutaminase in 2021 was mildly elevated, repeat in 2023 was normal.  Patient states that she has been on a gluten-free diet.  Had an abdominal ultrasound in 2018 which was unremarkable.  Both parents had cholecystectomy, brother is currently undergoing a HIDA scan to rule out gallbladder pathology.  States that her right upper quadrant pain is intermittent, rates it as a 2-3 on a scale of 10.  Avoids greasy, fried food as it makes her nauseous.  Denies having any unintentional weight loss.  Denies having any systemic symptoms such as fever, nausea, vomiting, constipation or diarrhea, chest pain or shortness of breath  Like the nurse practitioner at her GI's office, would like to be referred again    Right lower quadrant pain  Started Monday, was very severe.  Is stable and relatively better today  Went to urgent care on Tuesday, had urine pregnancy test that was negative, also had a UA which was not concerning for infection  Is not sexually active, denies having any concerns for STD or pregnancy  Had

## 2025-07-18 ENCOUNTER — OFFICE VISIT (OUTPATIENT)
Dept: FAMILY MEDICINE CLINIC | Age: 25
End: 2025-07-18

## 2025-07-18 VITALS
WEIGHT: 140 LBS | RESPIRATION RATE: 16 BRPM | TEMPERATURE: 97.8 F | HEIGHT: 66 IN | OXYGEN SATURATION: 98 % | DIASTOLIC BLOOD PRESSURE: 69 MMHG | BODY MASS INDEX: 22.5 KG/M2 | HEART RATE: 92 BPM | SYSTOLIC BLOOD PRESSURE: 98 MMHG

## 2025-07-18 DIAGNOSIS — R10.31 RIGHT LOWER QUADRANT PAIN: ICD-10-CM

## 2025-07-18 DIAGNOSIS — R00.0 TACHYCARDIA: Primary | ICD-10-CM

## 2025-07-18 LAB
ALBUMIN: 4.9 G/DL (ref 3.5–5.2)
ALP BLD-CCNC: 47 U/L (ref 35–104)
ALT SERPL-CCNC: 10 U/L (ref 0–35)
ANION GAP SERPL CALCULATED.3IONS-SCNC: 15 MMOL/L (ref 7–16)
AST SERPL-CCNC: 19 U/L (ref 0–35)
BASOPHILS ABSOLUTE: 0.05 K/UL (ref 0–0.2)
BASOPHILS RELATIVE PERCENT: 1 % (ref 0–2)
BILIRUB SERPL-MCNC: 0.8 MG/DL (ref 0–1.2)
BUN BLDV-MCNC: 12 MG/DL (ref 6–20)
CALCIUM SERPL-MCNC: 9.9 MG/DL (ref 8.6–10)
CHLORIDE BLD-SCNC: 102 MMOL/L (ref 98–107)
CHOLESTEROL, TOTAL: 158 MG/DL
CO2: 23 MMOL/L (ref 22–29)
CREAT SERPL-MCNC: 0.7 MG/DL (ref 0.5–1)
EOSINOPHILS ABSOLUTE: 0.16 K/UL (ref 0.05–0.5)
EOSINOPHILS RELATIVE PERCENT: 2 % (ref 0–6)
FERRITIN: 198 NG/ML
GFR, ESTIMATED: >90 ML/MIN/1.73M2
GLUCOSE BLD-MCNC: 95 MG/DL (ref 74–99)
HCT VFR BLD CALC: 39.1 % (ref 34–48)
HDLC SERPL-MCNC: 56 MG/DL
HEMOGLOBIN: 13.7 G/DL (ref 11.5–15.5)
IMMATURE GRANULOCYTES %: 0 % (ref 0–5)
IMMATURE GRANULOCYTES ABSOLUTE: 0.03 K/UL (ref 0–0.58)
IRON % SATURATION: 36 % (ref 15–50)
IRON: 102 UG/DL (ref 37–145)
LDL CHOLESTEROL: 87 MG/DL
LYMPHOCYTES ABSOLUTE: 2.04 K/UL (ref 1.5–4)
LYMPHOCYTES RELATIVE PERCENT: 22 % (ref 20–42)
MCH RBC QN AUTO: 31.9 PG (ref 26–35)
MCHC RBC AUTO-ENTMCNC: 35 G/DL (ref 32–34.5)
MCV RBC AUTO: 91.1 FL (ref 80–99.9)
MONOCYTES ABSOLUTE: 0.65 K/UL (ref 0.1–0.95)
MONOCYTES RELATIVE PERCENT: 7 % (ref 2–12)
NEUTROPHILS ABSOLUTE: 6.32 K/UL (ref 1.8–7.3)
NEUTROPHILS RELATIVE PERCENT: 68 % (ref 43–80)
PDW BLD-RTO: 12 % (ref 11.5–15)
PLATELET # BLD: 275 K/UL (ref 130–450)
PMV BLD AUTO: 10.5 FL (ref 7–12)
POTASSIUM SERPL-SCNC: 4.4 MMOL/L (ref 3.5–5.1)
RBC # BLD: 4.29 M/UL (ref 3.5–5.5)
SODIUM BLD-SCNC: 140 MMOL/L (ref 136–145)
TOTAL IRON BINDING CAPACITY: 287 UG/DL (ref 250–450)
TOTAL PROTEIN: 7.6 G/DL (ref 6.4–8.3)
TRIGL SERPL-MCNC: 76 MG/DL
VLDLC SERPL CALC-MCNC: 15 MG/DL
WBC # BLD: 9.3 K/UL (ref 4.5–11.5)

## 2025-07-18 RX ORDER — CLONAZEPAM 0.5 MG/1
0.5 TABLET ORAL DAILY PRN
COMMUNITY

## 2025-07-18 NOTE — PROGRESS NOTES
S: 24 y.o. female with   Chief Complaint   Patient presents with    Abdominal Pain     LLQ x 5 days and URQ recurrent pain    Dizziness    Health Maintenance     Declined vaccines       Chronic abdominal pain  -f/u  -still having issues  -has seen specialist for this  -now having new onset RLQ, went to UC, did pregnancy test which was negative, dc'd to home  -follows with Dr. Hector, had scope (EGD) around 19 years of age  -concerns for celiac disease???? Trying gluten free diet which does help  -RUQ recurs, states family issues with gallbladder     Seeing counseling and on zoloft for mood    O: VS:  height is 1.676 m (5' 6\") and weight is 63.5 kg (140 lb). Her temporal temperature is 97.8 °F (36.6 °C). Her blood pressure is 98/69 and her pulse is 92. Her respiration is 16 and oxygen saturation is 98%.   BP Readings from Last 3 Encounters:   07/18/25 98/69   05/19/25 104/73   03/18/25 110/89     See resident note    Impression/Plan:   1) chronic abdominal pain - f/u, still having issues, complete abdominal US, labs       Health Maintenance Due   Topic Date Due    HIV screen  Never done    Hepatitis C screen  Never done    Pneumococcal 0-49 years Vaccine (1 of 2 - PCV) 12/07/2018    Pap smear  Never done    COVID-19 Vaccine (4 - 2024-25 season) 09/01/2024    HPV vaccine (2 - 3-dose series) 09/10/2024         Attending Physician Statement  I have discussed the case, including pertinent history and exam findings with the resident.  I agree with the documented assessment and plan.      Alireza Booth, DO

## 2025-07-22 ENCOUNTER — HOSPITAL ENCOUNTER (OUTPATIENT)
Dept: ULTRASOUND IMAGING | Age: 25
Discharge: HOME | End: 2025-07-24
Payer: COMMERCIAL

## 2025-07-22 DIAGNOSIS — R10.31 RIGHT LOWER QUADRANT PAIN: Primary | ICD-10-CM

## 2025-07-22 DIAGNOSIS — R10.31 RIGHT LOWER QUADRANT PAIN: ICD-10-CM

## 2025-07-22 PROCEDURE — 76700 US EXAM ABDOM COMPLETE: CPT

## 2025-07-23 ENCOUNTER — HOSPITAL ENCOUNTER (OUTPATIENT)
Dept: ULTRASOUND IMAGING | Age: 25
Discharge: HOME | End: 2025-07-25
Payer: COMMERCIAL

## 2025-07-23 DIAGNOSIS — R10.31 RIGHT LOWER QUADRANT PAIN: ICD-10-CM

## 2025-07-23 PROCEDURE — 76856 US EXAM PELVIC COMPLETE: CPT

## 2025-07-30 ENCOUNTER — TRANSCRIBE ORDERS (OUTPATIENT)
Dept: ADMINISTRATIVE | Age: 25
End: 2025-07-30

## 2025-07-30 DIAGNOSIS — R10.84 ABDOMINAL PAIN, GENERALIZED: Primary | ICD-10-CM

## 2025-08-08 ENCOUNTER — HOSPITAL ENCOUNTER (OUTPATIENT)
Dept: NUCLEAR MEDICINE | Age: 25
Discharge: HOME | End: 2025-08-10
Payer: COMMERCIAL

## 2025-08-08 ENCOUNTER — HOSPITAL ENCOUNTER (OUTPATIENT)
Dept: LAB | Age: 25
Discharge: HOME | End: 2025-08-08
Payer: COMMERCIAL

## 2025-08-08 VITALS — WEIGHT: 144 LBS | BODY MASS INDEX: 23.24 KG/M2

## 2025-08-08 DIAGNOSIS — R10.84 ABDOMINAL PAIN, GENERALIZED: ICD-10-CM

## 2025-08-08 LAB
AMYLASE SERPL-CCNC: 53 U/L (ref 28–100)
CRP SERPL HS-MCNC: 6.6 MG/L (ref 0–5)
ERYTHROCYTE [SEDIMENTATION RATE] IN BLOOD BY WESTERGREN METHOD: 5 MM/HR (ref 0–20)
LIPASE SERPL-CCNC: 31 U/L (ref 13–60)

## 2025-08-08 PROCEDURE — 78227 HEPATOBIL SYST IMAGE W/DRUG: CPT

## 2025-08-08 PROCEDURE — 86140 C-REACTIVE PROTEIN: CPT

## 2025-08-08 PROCEDURE — 2580000003 HC RX 258: Performed by: RADIOLOGY

## 2025-08-08 PROCEDURE — 82150 ASSAY OF AMYLASE: CPT

## 2025-08-08 PROCEDURE — 36415 COLL VENOUS BLD VENIPUNCTURE: CPT

## 2025-08-08 PROCEDURE — 3430000000 HC RX DIAGNOSTIC RADIOPHARMACEUTICAL: Performed by: RADIOLOGY

## 2025-08-08 PROCEDURE — 6360000002 HC RX W HCPCS: Performed by: RADIOLOGY

## 2025-08-08 PROCEDURE — 85652 RBC SED RATE AUTOMATED: CPT

## 2025-08-08 PROCEDURE — 83690 ASSAY OF LIPASE: CPT

## 2025-08-08 PROCEDURE — 83516 IMMUNOASSAY NONANTIBODY: CPT

## 2025-08-08 PROCEDURE — A9537 TC99M MEBROFENIN: HCPCS | Performed by: RADIOLOGY

## 2025-08-08 RX ADMIN — SINCALIDE 1.31 MCG: 5 INJECTION, POWDER, LYOPHILIZED, FOR SOLUTION INTRAVENOUS at 08:55

## 2025-08-08 RX ADMIN — Medication 6 MILLICURIE: at 07:59

## 2025-08-11 LAB
GLIADIN IGA SER IA-ACNC: 1.6 U/ML
GLIADIN IGG SER IA-ACNC: 1.1 U/ML
TISSUE TRANSGLUTAMINASE ANTIBODY IGG: 0.9 U/ML
TTG IGA SER IA-ACNC: 0.8 U/ML

## 2025-08-14 ENCOUNTER — OFFICE VISIT (OUTPATIENT)
Dept: FAMILY MEDICINE CLINIC | Age: 25
End: 2025-08-14
Payer: COMMERCIAL

## 2025-08-14 VITALS
SYSTOLIC BLOOD PRESSURE: 106 MMHG | TEMPERATURE: 97.5 F | RESPIRATION RATE: 16 BRPM | DIASTOLIC BLOOD PRESSURE: 79 MMHG | HEART RATE: 94 BPM | HEIGHT: 66 IN | BODY MASS INDEX: 22.18 KG/M2 | WEIGHT: 138 LBS | OXYGEN SATURATION: 98 %

## 2025-08-14 DIAGNOSIS — R10.11 RUQ PAIN: Primary | ICD-10-CM

## 2025-08-14 DIAGNOSIS — R00.0 TACHYCARDIA: ICD-10-CM

## 2025-08-14 PROCEDURE — 99213 OFFICE O/P EST LOW 20 MIN: CPT

## 2025-08-19 DIAGNOSIS — R10.31 RIGHT LOWER QUADRANT PAIN: Primary | ICD-10-CM

## 2025-08-20 ENCOUNTER — HOSPITAL ENCOUNTER (OUTPATIENT)
Dept: SLEEP CENTER | Age: 25
Discharge: HOME | End: 2025-08-20
Payer: COMMERCIAL

## 2025-08-20 DIAGNOSIS — R00.0 TACHYCARDIA: ICD-10-CM

## 2025-08-20 PROCEDURE — 93246 EXT ECG>7D<15D RECORDING: CPT
